# Patient Record
Sex: MALE | Race: WHITE | Employment: PART TIME | URBAN - METROPOLITAN AREA
[De-identification: names, ages, dates, MRNs, and addresses within clinical notes are randomized per-mention and may not be internally consistent; named-entity substitution may affect disease eponyms.]

---

## 2018-08-03 ENCOUNTER — HOSPITAL ENCOUNTER (EMERGENCY)
Age: 48
Discharge: HOME | End: 2018-08-03

## 2018-08-03 VITALS
OXYGEN SATURATION: 98 % | RESPIRATION RATE: 18 BRPM | WEIGHT: 220 LBS | SYSTOLIC BLOOD PRESSURE: 179 MMHG | BODY MASS INDEX: 31.5 KG/M2 | HEIGHT: 70 IN | DIASTOLIC BLOOD PRESSURE: 92 MMHG | HEART RATE: 84 BPM | TEMPERATURE: 97.9 F

## 2018-08-03 DIAGNOSIS — B35.9 TINEA: Primary | ICD-10-CM

## 2018-08-03 PROCEDURE — 99282 EMERGENCY DEPT VISIT SF MDM: CPT

## 2018-08-03 RX ORDER — CLOTRIMAZOLE 1 %
CREAM (GRAM) TOPICAL
Qty: 1 TUBE | Refills: 1 | Status: SHIPPED | OUTPATIENT
Start: 2018-08-03 | End: 2018-08-10

## 2018-08-03 NOTE — ED PROVIDER NOTES
CHIEF COMPLAINT  Chief Complaint   Patient presents with    Rash     to groin. has been there for awhile. pt states that it is now spreading. HISTORY OF PRESENT ILLNESS  Nilesh Enamorado is a 52 y.o. male presents to the emergency room by Private vehicle for evaluation of a rash in the groin ×2 weeks that initially was improving and now is worsened. He states it is itchy and flaky. Warm water has improved and no other treatment prior to arrival.  Becomes worse after a long weekend working in the hot sun. Denies pain. Denies discharge. Denies fever, chills, nausea, vomiting, diarrhea, abdominal pain, injury, and recent infection or antibiotic usage.     ]     No other complaints, modifying factors or associated symptoms. Nursing notes reviewed. Past Medical History:   Diagnosis Date    [facility]     Pneumonia      History reviewed. No pertinent surgical history. History reviewed. No pertinent family history. Social History     Social History    Marital status: Single     Spouse name: N/A    Number of children: N/A    Years of education: N/A     Occupational History    Not on file. Social History Main Topics    Smoking status: Never Smoker    Smokeless tobacco: Never Used    Alcohol use No    Drug use: No    Sexual activity: Yes     Partners: Female     Other Topics Concern    Not on file     Social History Narrative    No narrative on file     No current facility-administered medications for this encounter. Current Outpatient Prescriptions   Medication Sig Dispense Refill    clotrimazole (LOTRIMIN) 1 % cream Apply topically 3 times daily to lesions on skin.  1 Tube 1     No Known Allergies    REVIEW OF SYSTEMS  6 systems reviewed, pertinent positives per HPI otherwise noted to be negative    PHYSICAL EXAM  BP (!) 179/92   Pulse 84   Temp 97.9 °F (36.6 °C) (Oral)   Resp 18   Ht 5' 10\" (1.778 m)   Wt 220 lb (99.8 kg)   SpO2 98%   BMI 31.57 kg/m² stable condition with outpatient follow up. Instructed patient and/or family to return to the emergency room for new or worsening symptoms and any concerns. Instructed to call referrals below to discuss ER visit and follow-up plan. The patient tolerated their visit well. The patient and / or the family were informed of the results of any tests, a time was given to answer questions, a plan was proposed and they agreed with plan. Patient was given scripts for the following medications. I counseled patient how to take these medications. New Prescriptions    CLOTRIMAZOLE (LOTRIMIN) 1 % CREAM    Apply topically 3 times daily to lesions on skin. CLINICAL IMPRESSION  1. Tinea        Blood pressure (!) 179/92, pulse 84, temperature 97.9 °F (36.6 °C), temperature source Oral, resp. rate 18, height 5' 10\" (1.778 m), weight 220 lb (99.8 kg), SpO2 98 %. DISPOSITION  DISPOSITION Decision To Discharge 08/03/2018 07:57:22 PM      PAT[facility]:  [place] [facility]  [address] [facility]  [phone]    If symptoms do not improve, for concerns, worsening or emergent symptoms      DISCHARGE MEDICATIONS:  New Prescriptions    CLOTRIMAZOLE (LOTRIMIN) 1 % CREAM    Apply topically 3 times daily to lesions on skin. DISCONTINUED MEDICATIONS:  Discontinued Medications    IBUPROFEN (IBU) 800 MG TABLET    Take 1 tablet by mouth every 6 hours as needed for Pain                ED Freire CNP (electronically signed)     Acute Care [facility]    Please note that this chart was generated using Dragon dictation software. Although every effort was made to ensure the accuracy of this automated transcription, some errors in transcription may have occurred      I have independently evaluated this patient. A physician was available for consult.         ED Freire CNP  08/03/18 2001

## 2018-09-05 ENCOUNTER — HOSPITAL ENCOUNTER (EMERGENCY)
Age: 48
Discharge: HOME | End: 2018-09-05
Attending: EMERGENCY MEDICINE
Payer: MEDICAID

## 2018-09-05 VITALS
SYSTOLIC BLOOD PRESSURE: 142 MMHG | TEMPERATURE: 98.2 F | HEIGHT: 70 IN | WEIGHT: 230 LBS | RESPIRATION RATE: 17 BRPM | DIASTOLIC BLOOD PRESSURE: 90 MMHG | OXYGEN SATURATION: 96 % | HEART RATE: 88 BPM | BODY MASS INDEX: 32.93 KG/M2

## 2018-09-05 DIAGNOSIS — J06.9 ACUTE UPPER RESPIRATORY INFECTION: Primary | ICD-10-CM

## 2018-09-05 PROCEDURE — 99283 EMERGENCY DEPT VISIT LOW MDM: CPT

## 2018-09-05 PROCEDURE — 6370000000 HC RX 637 (ALT 250 FOR IP): Performed by: EMERGENCY MEDICINE

## 2018-09-05 RX ORDER — OXYMETAZOLINE HYDROCHLORIDE 0.05 G/100ML
2 SPRAY NASAL ONCE
Status: COMPLETED | OUTPATIENT
Start: 2018-09-05 | End: 2018-09-05

## 2018-09-05 RX ORDER — IBUPROFEN 400 MG/1
400 TABLET ORAL EVERY 6 HOURS PRN
Qty: 30 TABLET | Refills: 0 | Status: SHIPPED | OUTPATIENT
Start: 2018-09-05 | End: 2021-09-11

## 2018-09-05 RX ORDER — IBUPROFEN 400 MG/1
400 TABLET ORAL ONCE
Status: COMPLETED | OUTPATIENT
Start: 2018-09-05 | End: 2018-09-05

## 2018-09-05 RX ORDER — PSEUDOEPHEDRINE HCL 120 MG/1
120 TABLET, FILM COATED, EXTENDED RELEASE ORAL EVERY 12 HOURS
Qty: 20 TABLET | Refills: 0 | Status: SHIPPED | OUTPATIENT
Start: 2018-09-05 | End: 2018-09-15

## 2018-09-05 RX ORDER — PSEUDOEPHEDRINE HYDROCHLORIDE 30 MG/1
30 TABLET ORAL ONCE
Status: COMPLETED | OUTPATIENT
Start: 2018-09-05 | End: 2018-09-05

## 2018-09-05 RX ADMIN — PSEUDOEPHEDRINE HCL 30 MG: 30 TABLET, FILM COATED ORAL at 09:51

## 2018-09-05 RX ADMIN — IBUPROFEN 400 MG: 400 TABLET ORAL at 09:51

## 2018-09-05 RX ADMIN — OXYMETAZOLINE HYDROCHLORIDE 2 SPRAY: 5 SPRAY NASAL at 09:51

## 2018-09-05 ASSESSMENT — PAIN DESCRIPTION - PAIN TYPE: TYPE: ACUTE PAIN

## 2018-09-05 ASSESSMENT — PAIN SCALES - GENERAL: PAINLEVEL_OUTOF10: 1

## 2018-09-05 ASSESSMENT — PAIN DESCRIPTION - LOCATION: LOCATION: THROAT

## 2018-09-05 ASSESSMENT — PAIN DESCRIPTION - ORIENTATION: ORIENTATION: LEFT

## 2018-09-05 NOTE — ED NOTES
Discharge instructions reviewed with Mr. Luis Donaldson. He verbalized understanding. Copy of discharge instructions and prescriptions given. Mr. Luis Donaldson was discharged to home in good condition per personal vehicle, friend/family driving. He exited the ED without difficulty.         Adrianna Quinones RN  09/05/18 6757

## 2021-08-05 ENCOUNTER — HOSPITAL ENCOUNTER (INPATIENT)
Age: 51
LOS: 3 days | Discharge: HOME | DRG: 137 | End: 2021-08-09
Attending: EMERGENCY MEDICINE | Admitting: INTERNAL MEDICINE
Payer: MEDICAID

## 2021-08-05 DIAGNOSIS — U07.1 COVID-19: Primary | ICD-10-CM

## 2021-08-05 DIAGNOSIS — I26.99 ACUTE PULMONARY EMBOLISM WITHOUT ACUTE COR PULMONALE, UNSPECIFIED PULMONARY EMBOLISM TYPE (HCC): ICD-10-CM

## 2021-08-05 PROCEDURE — 99284 EMERGENCY DEPT VISIT MOD MDM: CPT

## 2021-08-05 PROCEDURE — 87636 SARSCOV2 & INF A&B AMP PRB: CPT

## 2021-08-05 NOTE — LETTER
[facility] PCU Telemetry  [address] [address]  Phone: [phone]             August 9, 2021    Patient: Mariam Andrade   YOB: 1970   Date of Visit: 8/5/2021       To Whom It May Concern:    Deng Barreto may be excused from work from 07/26/2021 and was seen and treated in our facility  beginning 8/5/2021 until 8/9/2021. He may return to work on 8/16/2021.       Sincerely,       Celia Spencer RN         Signature:__________________________________

## 2021-08-06 ENCOUNTER — APPOINTMENT (OUTPATIENT)
Dept: GENERAL RADIOLOGY | Age: 51
DRG: 137 | End: 2021-08-06
Payer: MEDICAID

## 2021-08-06 ENCOUNTER — APPOINTMENT (OUTPATIENT)
Dept: CT IMAGING | Age: 51
DRG: 137 | End: 2021-08-06
Payer: MEDICAID

## 2021-08-06 PROBLEM — U07.1 PULMONARY EMBOLISM ASSOCIATED WITH COVID-19 (HCC): Status: ACTIVE | Noted: 2021-08-06

## 2021-08-06 PROBLEM — I26.99 PULMONARY EMBOLISM ASSOCIATED WITH COVID-19 (HCC): Status: ACTIVE | Noted: 2021-08-06

## 2021-08-06 LAB
A/G RATIO: 0.9 (ref 1.1–2.2)
ABO/RH: NORMAL
ALBUMIN SERPL-MCNC: 3.6 G/DL (ref 3.4–5)
ALP BLD-CCNC: 102 U/L (ref 40–129)
ALT SERPL-CCNC: 50 U/L (ref 10–40)
ANION GAP SERPL CALCULATED.3IONS-SCNC: 13 MMOL/L (ref 3–16)
ANTIBODY SCREEN: NORMAL
APTT: 31.1 SEC (ref 26.2–38.6)
AST SERPL-CCNC: 41 U/L (ref 15–37)
BASOPHILS ABSOLUTE: 0.1 K/UL (ref 0–0.2)
BASOPHILS RELATIVE PERCENT: 0.7 %
BILIRUB SERPL-MCNC: 0.7 MG/DL (ref 0–1)
BUN BLDV-MCNC: 16 MG/DL (ref 7–20)
CALCIUM SERPL-MCNC: 9.2 MG/DL (ref 8.3–10.6)
CHLORIDE BLD-SCNC: 101 MMOL/L (ref 99–110)
CO2: 22 MMOL/L (ref 21–32)
CREAT SERPL-MCNC: 0.7 MG/DL (ref 0.9–1.3)
D DIMER: >5250 NG/ML DDU (ref 0–229)
EOSINOPHILS ABSOLUTE: 0.2 K/UL (ref 0–0.6)
EOSINOPHILS RELATIVE PERCENT: 1.3 %
GFR AFRICAN AMERICAN: >60
GFR NON-AFRICAN AMERICAN: >60
GLOBULIN: 4 G/DL
GLUCOSE BLD-MCNC: 203 MG/DL (ref 70–99)
GLUCOSE BLD-MCNC: 230 MG/DL (ref 70–99)
GLUCOSE BLD-MCNC: 234 MG/DL (ref 70–99)
GLUCOSE BLD-MCNC: 301 MG/DL (ref 70–99)
GLUCOSE BLD-MCNC: 309 MG/DL (ref 70–99)
HCT VFR BLD CALC: 41.9 % (ref 40.5–52.5)
HEMOGLOBIN: 14.3 G/DL (ref 13.5–17.5)
INFLUENZA A: NOT DETECTED
INFLUENZA B: NOT DETECTED
LYMPHOCYTES ABSOLUTE: 1.8 K/UL (ref 1–5.1)
LYMPHOCYTES RELATIVE PERCENT: 13.1 %
MCH RBC QN AUTO: 29.2 PG (ref 26–34)
MCHC RBC AUTO-ENTMCNC: 34.2 G/DL (ref 31–36)
MCV RBC AUTO: 85.4 FL (ref 80–100)
MONOCYTES ABSOLUTE: 0.9 K/UL (ref 0–1.3)
MONOCYTES RELATIVE PERCENT: 6.2 %
NEUTROPHILS ABSOLUTE: 11 K/UL (ref 1.7–7.7)
NEUTROPHILS RELATIVE PERCENT: 78.7 %
PDW BLD-RTO: 13 % (ref 12.4–15.4)
PERFORMED ON: ABNORMAL
PLATELET # BLD: 256 K/UL (ref 135–450)
PMV BLD AUTO: 9.9 FL (ref 5–10.5)
POTASSIUM REFLEX MAGNESIUM: 3.8 MMOL/L (ref 3.5–5.1)
RBC # BLD: 4.9 M/UL (ref 4.2–5.9)
SARS-COV-2 RNA, RT PCR: DETECTED
SODIUM BLD-SCNC: 136 MMOL/L (ref 136–145)
TOTAL PROTEIN: 7.6 G/DL (ref 6.4–8.2)
TROPONIN: <0.01 NG/ML
WBC # BLD: 13.9 K/UL (ref 4–11)

## 2021-08-06 PROCEDURE — 2580000003 HC RX 258: Performed by: INTERNAL MEDICINE

## 2021-08-06 PROCEDURE — 2580000003 HC RX 258: Performed by: EMERGENCY MEDICINE

## 2021-08-06 PROCEDURE — 6370000000 HC RX 637 (ALT 250 FOR IP): Performed by: INTERNAL MEDICINE

## 2021-08-06 PROCEDURE — 85379 FIBRIN DEGRADATION QUANT: CPT

## 2021-08-06 PROCEDURE — 99223 1ST HOSP IP/OBS HIGH 75: CPT | Performed by: INTERNAL MEDICINE

## 2021-08-06 PROCEDURE — 6360000004 HC RX CONTRAST MEDICATION: Performed by: EMERGENCY MEDICINE

## 2021-08-06 PROCEDURE — 6360000002 HC RX W HCPCS: Performed by: INTERNAL MEDICINE

## 2021-08-06 PROCEDURE — 94640 AIRWAY INHALATION TREATMENT: CPT

## 2021-08-06 PROCEDURE — 85025 COMPLETE CBC W/AUTO DIFF WBC: CPT

## 2021-08-06 PROCEDURE — 87449 NOS EACH ORGANISM AG IA: CPT

## 2021-08-06 PROCEDURE — 85730 THROMBOPLASTIN TIME PARTIAL: CPT

## 2021-08-06 PROCEDURE — 87205 SMEAR GRAM STAIN: CPT

## 2021-08-06 PROCEDURE — 6360000002 HC RX W HCPCS: Performed by: EMERGENCY MEDICINE

## 2021-08-06 PROCEDURE — 84484 ASSAY OF TROPONIN QUANT: CPT

## 2021-08-06 PROCEDURE — 2060000000 HC ICU INTERMEDIATE R&B

## 2021-08-06 PROCEDURE — 87070 CULTURE OTHR SPECIMN AEROBIC: CPT

## 2021-08-06 PROCEDURE — 71045 X-RAY EXAM CHEST 1 VIEW: CPT

## 2021-08-06 PROCEDURE — 71260 CT THORAX DX C+: CPT

## 2021-08-06 PROCEDURE — 36415 COLL VENOUS BLD VENIPUNCTURE: CPT

## 2021-08-06 PROCEDURE — 86901 BLOOD TYPING SEROLOGIC RH(D): CPT

## 2021-08-06 PROCEDURE — 87040 BLOOD CULTURE FOR BACTERIA: CPT

## 2021-08-06 PROCEDURE — 86850 RBC ANTIBODY SCREEN: CPT

## 2021-08-06 PROCEDURE — 80053 COMPREHEN METABOLIC PANEL: CPT

## 2021-08-06 PROCEDURE — XW033E5 INTRODUCTION OF REMDESIVIR ANTI-INFECTIVE INTO PERIPHERAL VEIN, PERCUTANEOUS APPROACH, NEW TECHNOLOGY GROUP 5: ICD-10-PCS | Performed by: INTERNAL MEDICINE

## 2021-08-06 PROCEDURE — 96365 THER/PROPH/DIAG IV INF INIT: CPT

## 2021-08-06 PROCEDURE — 99255 IP/OBS CONSLTJ NEW/EST HI 80: CPT | Performed by: INTERNAL MEDICINE

## 2021-08-06 PROCEDURE — 83036 HEMOGLOBIN GLYCOSYLATED A1C: CPT

## 2021-08-06 PROCEDURE — 86900 BLOOD TYPING SEROLOGIC ABO: CPT

## 2021-08-06 RX ORDER — DEXTROSE MONOHYDRATE 25 G/50ML
12.5 INJECTION, SOLUTION INTRAVENOUS PRN
Status: DISCONTINUED | OUTPATIENT
Start: 2021-08-06 | End: 2021-08-09 | Stop reason: HOSPADM

## 2021-08-06 RX ORDER — HEPARIN SODIUM 1000 [USP'U]/ML
6700 INJECTION, SOLUTION INTRAVENOUS; SUBCUTANEOUS PRN
Status: DISCONTINUED | OUTPATIENT
Start: 2021-08-06 | End: 2021-08-06

## 2021-08-06 RX ORDER — 0.9 % SODIUM CHLORIDE 0.9 %
1000 INTRAVENOUS SOLUTION INTRAVENOUS ONCE
Status: COMPLETED | OUTPATIENT
Start: 2021-08-06 | End: 2021-08-06

## 2021-08-06 RX ORDER — DEXAMETHASONE SODIUM PHOSPHATE 10 MG/ML
6 INJECTION, SOLUTION INTRAMUSCULAR; INTRAVENOUS EVERY 24 HOURS
Status: DISCONTINUED | OUTPATIENT
Start: 2021-08-06 | End: 2021-08-09 | Stop reason: HOSPADM

## 2021-08-06 RX ORDER — AZITHROMYCIN 250 MG/1
500 TABLET, FILM COATED ORAL DAILY
Status: DISCONTINUED | OUTPATIENT
Start: 2021-08-06 | End: 2021-08-09 | Stop reason: HOSPADM

## 2021-08-06 RX ORDER — ONDANSETRON 2 MG/ML
4 INJECTION INTRAMUSCULAR; INTRAVENOUS EVERY 6 HOURS PRN
Status: DISCONTINUED | OUTPATIENT
Start: 2021-08-06 | End: 2021-08-09 | Stop reason: HOSPADM

## 2021-08-06 RX ORDER — SODIUM CHLORIDE 0.9 % (FLUSH) 0.9 %
5-40 SYRINGE (ML) INJECTION EVERY 12 HOURS SCHEDULED
Status: DISCONTINUED | OUTPATIENT
Start: 2021-08-06 | End: 2021-08-09 | Stop reason: HOSPADM

## 2021-08-06 RX ORDER — HEPARIN SODIUM 1000 [USP'U]/ML
6700 INJECTION, SOLUTION INTRAVENOUS; SUBCUTANEOUS ONCE
Status: COMPLETED | OUTPATIENT
Start: 2021-08-06 | End: 2021-08-06

## 2021-08-06 RX ORDER — ALBUTEROL SULFATE 90 UG/1
2 AEROSOL, METERED RESPIRATORY (INHALATION)
Status: DISCONTINUED | OUTPATIENT
Start: 2021-08-06 | End: 2021-08-06

## 2021-08-06 RX ORDER — SODIUM CHLORIDE 0.9 % (FLUSH) 0.9 %
5-40 SYRINGE (ML) INJECTION PRN
Status: DISCONTINUED | OUTPATIENT
Start: 2021-08-06 | End: 2021-08-09 | Stop reason: HOSPADM

## 2021-08-06 RX ORDER — GUAIFENESIN/DEXTROMETHORPHAN 100-10MG/5
5 SYRUP ORAL EVERY 4 HOURS PRN
Status: DISCONTINUED | OUTPATIENT
Start: 2021-08-06 | End: 2021-08-09 | Stop reason: HOSPADM

## 2021-08-06 RX ORDER — SODIUM CHLORIDE 9 MG/ML
INJECTION, SOLUTION INTRAVENOUS PRN
Status: DISCONTINUED | OUTPATIENT
Start: 2021-08-06 | End: 2021-08-09 | Stop reason: HOSPADM

## 2021-08-06 RX ORDER — BENZONATATE 100 MG/1
100 CAPSULE ORAL 3 TIMES DAILY PRN
Status: DISCONTINUED | OUTPATIENT
Start: 2021-08-06 | End: 2021-08-09 | Stop reason: HOSPADM

## 2021-08-06 RX ORDER — VITAMIN B COMPLEX
2000 TABLET ORAL DAILY
Status: DISCONTINUED | OUTPATIENT
Start: 2021-08-06 | End: 2021-08-09 | Stop reason: HOSPADM

## 2021-08-06 RX ORDER — ALBUTEROL SULFATE 90 UG/1
2 AEROSOL, METERED RESPIRATORY (INHALATION) EVERY 6 HOURS PRN
Status: DISCONTINUED | OUTPATIENT
Start: 2021-08-06 | End: 2021-08-09 | Stop reason: HOSPADM

## 2021-08-06 RX ORDER — DEXTROSE MONOHYDRATE 50 MG/ML
100 INJECTION, SOLUTION INTRAVENOUS PRN
Status: DISCONTINUED | OUTPATIENT
Start: 2021-08-06 | End: 2021-08-09 | Stop reason: HOSPADM

## 2021-08-06 RX ORDER — ACETAMINOPHEN 650 MG/1
650 SUPPOSITORY RECTAL EVERY 6 HOURS PRN
Status: DISCONTINUED | OUTPATIENT
Start: 2021-08-06 | End: 2021-08-09 | Stop reason: HOSPADM

## 2021-08-06 RX ORDER — HEPARIN SODIUM 1000 [USP'U]/ML
3400 INJECTION, SOLUTION INTRAVENOUS; SUBCUTANEOUS PRN
Status: DISCONTINUED | OUTPATIENT
Start: 2021-08-06 | End: 2021-08-06

## 2021-08-06 RX ORDER — ACETAMINOPHEN 325 MG/1
650 TABLET ORAL EVERY 6 HOURS PRN
Status: DISCONTINUED | OUTPATIENT
Start: 2021-08-06 | End: 2021-08-09 | Stop reason: HOSPADM

## 2021-08-06 RX ORDER — POLYETHYLENE GLYCOL 3350 17 G/17G
17 POWDER, FOR SOLUTION ORAL DAILY PRN
Status: DISCONTINUED | OUTPATIENT
Start: 2021-08-06 | End: 2021-08-09 | Stop reason: HOSPADM

## 2021-08-06 RX ORDER — SODIUM CHLORIDE 9 MG/ML
INJECTION, SOLUTION INTRAVENOUS CONTINUOUS
Status: DISCONTINUED | OUTPATIENT
Start: 2021-08-06 | End: 2021-08-09 | Stop reason: HOSPADM

## 2021-08-06 RX ORDER — HEPARIN SODIUM 10000 [USP'U]/100ML
1510 INJECTION, SOLUTION INTRAVENOUS CONTINUOUS
Status: DISCONTINUED | OUTPATIENT
Start: 2021-08-06 | End: 2021-08-06

## 2021-08-06 RX ORDER — NICOTINE POLACRILEX 4 MG
15 LOZENGE BUCCAL PRN
Status: DISCONTINUED | OUTPATIENT
Start: 2021-08-06 | End: 2021-08-09 | Stop reason: HOSPADM

## 2021-08-06 RX ORDER — SODIUM CHLORIDE 9 MG/ML
25 INJECTION, SOLUTION INTRAVENOUS PRN
Status: DISCONTINUED | OUTPATIENT
Start: 2021-08-06 | End: 2021-08-09 | Stop reason: HOSPADM

## 2021-08-06 RX ADMIN — CEFTRIAXONE SODIUM 1000 MG: 1 INJECTION, POWDER, FOR SOLUTION INTRAMUSCULAR; INTRAVENOUS at 16:16

## 2021-08-06 RX ADMIN — AZITHROMYCIN 500 MG: 250 TABLET, FILM COATED ORAL at 16:36

## 2021-08-06 RX ADMIN — HEPARIN SODIUM 6700 UNITS: 1000 INJECTION INTRAVENOUS; SUBCUTANEOUS at 04:57

## 2021-08-06 RX ADMIN — DEXAMETHASONE SODIUM PHOSPHATE 6 MG: 10 INJECTION, SOLUTION INTRAMUSCULAR; INTRAVENOUS at 16:19

## 2021-08-06 RX ADMIN — INSULIN LISPRO 2 UNITS: 100 INJECTION, SOLUTION INTRAVENOUS; SUBCUTANEOUS at 22:00

## 2021-08-06 RX ADMIN — BENZONATATE 100 MG: 100 CAPSULE ORAL at 10:03

## 2021-08-06 RX ADMIN — GUAIFENESIN AND DEXTROMETHORPHAN 5 ML: 100; 10 SYRUP ORAL at 06:24

## 2021-08-06 RX ADMIN — APIXABAN 10 MG: 5 TABLET, FILM COATED ORAL at 10:30

## 2021-08-06 RX ADMIN — GUAIFENESIN AND DEXTROMETHORPHAN 5 ML: 100; 10 SYRUP ORAL at 10:04

## 2021-08-06 RX ADMIN — APIXABAN 10 MG: 5 TABLET, FILM COATED ORAL at 22:00

## 2021-08-06 RX ADMIN — SODIUM CHLORIDE: 9 INJECTION, SOLUTION INTRAVENOUS at 18:13

## 2021-08-06 RX ADMIN — SODIUM CHLORIDE: 9 INJECTION, SOLUTION INTRAVENOUS at 06:24

## 2021-08-06 RX ADMIN — SODIUM CHLORIDE 1000 ML: 9 INJECTION, SOLUTION INTRAVENOUS at 00:56

## 2021-08-06 RX ADMIN — HEPARIN SODIUM 1510 UNITS/HR: 10000 INJECTION, SOLUTION INTRAVENOUS at 03:48

## 2021-08-06 RX ADMIN — Medication 2000 UNITS: at 10:03

## 2021-08-06 RX ADMIN — IOPAMIDOL 85 ML: 755 INJECTION, SOLUTION INTRAVENOUS at 02:38

## 2021-08-06 RX ADMIN — IPRATROPIUM BROMIDE AND ALBUTEROL 1 PUFF: 20; 100 SPRAY, METERED RESPIRATORY (INHALATION) at 16:04

## 2021-08-06 RX ADMIN — IPRATROPIUM BROMIDE AND ALBUTEROL 1 PUFF: 20; 100 SPRAY, METERED RESPIRATORY (INHALATION) at 07:52

## 2021-08-06 ASSESSMENT — PAIN SCALES - GENERAL: PAINLEVEL_OUTOF10: 0

## 2021-08-06 NOTE — ED PROVIDER NOTES
[address] [facility] PCU TELEMETRY      CHIEF COMPLAINT  Concern For FARIT-65 (Pt states he has had SOB, cough, loss of smell since . States it has worsened, pt O2 90-93% on RA during triage. )       HISTORY OF PRESENT ILLNESS  Mariluz Chaves is a 48 y.o. male  who presents to the ED for evaluation of shortness of breath. Patient reports he has all the classic symptoms of COVID-19. Reports started , he has been having cough, shortness of breath, loss of smell, fatigue. Reports he has been feeling miserable and cough has been worsening. Reports he was initially having shortness of breath with exertion. Now it has progressed to where he feels short of breath even at rest.  Reports decreased p.o. intake over the past several days. Reports multiple family members have had the same symptoms. He has not had Covid vaccination. Denies having history of asthma or COPD. Denies history of smoking. Denies having chest pain. No other complaints, modifying factors or associated symptoms. I have reviewed the following from the nursing documentation. Past Medical History:   Diagnosis Date    Diabetes mellitus ([address]     Pneumonia      Past Surgical History:   Procedure Laterality Date    COCCYX FRACTURE SURGERY       History reviewed. No pertinent family history.   Social History     Socioeconomic History    Marital status: Single     Spouse name: Not on file    Number of children: Not on file    Years of education: Not on file    Highest education level: Not on file   Occupational History    Not on file   Tobacco Use    Smoking status: Former Smoker     Types: Cigarettes     Quit date:      Years since quittin.6    Smokeless tobacco: Never Used   Vaping Use    Vaping Use: Never used   Substance and Sexual Activity    Alcohol use: No    Drug use: No    Sexual activity: Yes     Partners: Female   Other Topics Concern    Not on file   Social History Narrative    Not on file     Social Determinants of Health     Financial Resource Strain:     Difficulty of Paying Living Expenses:    Food Insecurity:     Worried About Running Out of Food in the Last Year:     [address] in the Last Year:    Transportation Needs:     Lack of Transportation (Medical):      Lack of Transportation (Non-Medical):    Physical Activity:     Days of Exercise per Week:     Minutes of Exercise per Session:    Stress:     Feeling of Stress :    Social Connections:     Frequency of Communication with Friends and Family:     Frequency of Social Gatherings with Friends and Family:     Attends Yarsanism Services:     Active Member of Clubs or Organizations:     Attends Club or Organization Meetings:     Marital Status:    Intimate Partner Violence:     Fear of Current or Ex-Partner:     Emotionally Abused:     Physically Abused:     Sexually Abused:      Current Facility-Administered Medications   Medication Dose Route Frequency Provider Last Rate Last Admin    insulin glargine (LANTUS) injection vial 10 Units  10 Units Subcutaneous Once Juan Antonio Brown MD        amLODIPine (NORVASC) tablet 5 mg  5 mg Oral Daily Juan Antonio Brown MD        [facility]dansetron [facility]) injection 4 mg  4 mg Intravenous Q6H PRN Vi King MD        albuterol sulfate  (90 Base) MCG/ACT inhaler 2 puff  2 puff Inhalation Q6H PRN Vi King MD        glucose (GLUTOSE) 40 % oral gel 15 g  15 g Oral PRN Vi King MD        dextrose 50 % IV solution  12.5 g Intravenous PRN Vi King MD        glucagon (rDNA) injection 1 mg  1 mg Intramuscular PRN Vi King MD        dextrose 5 % solution  100 mL/hr Intravenous PRN Vi King MD        sodium chloride flush 0.9 % injection 5-40 mL  5-40 mL Intravenous 2 times per day Vi King MD        sodium chloride flush 0.9 % injection 5-40 mL  5-40 mL Intravenous PRN Vi King MD        0.9 % sodium chloride infusion 25 mL Intravenous PRN Meryl Pearce MD        polyethylene glycol [facility]) packet 17 g  17 g Oral Daily PRN Meryl Pearce MD        acetaminophen (TYLENOL) tablet 650 mg  650 mg Oral Q6H PRN Meryl Pearce MD        Or    acetaminophen (TYLENOL) suppository 650 mg  650 mg Rectal Q6H PRN Meryl Pearce MD        0.9 % sodium chloride infusion   Intravenous Continuous Meryl Pearce MD 75 mL/hr at 08/06/21 1833 Rate Verify at 08/06/21 1833    insulin lispro (HUMALOG) injection vial 0-6 Units  0-6 Units Subcutaneous TID WC Meryl Pearce MD   2 Units at 08/06/21 1637    insulin lispro (HUMALOG) injection vial 0-3 Units  0-3 Units Subcutaneous Nightly Meryl Pearce MD   2 Units at 08/06/21 2200    guaiFENesin-dextromethorphan (ROBITUSSIN DM) 100-10 MG/5ML syrup 5 mL  5 mL Oral Q4H PRN Meryl Pearce MD   5 mL at 08/06/21 1004    Vitamin D (CHOLECALCIFEROL) tablet 2,000 Units  2,000 Units Oral Daily Meryl Pearce MD   2,000 Units at 08/06/21 1003    apixaban (ELIQUIS) tablet 10 mg  10 mg Oral BID Serafin Lay MD   10 mg at 08/06/21 2200    benzonatate (TESSALON) capsule 100 mg  100 mg Oral TID PRN Serafin Lay MD   100 mg at 08/06/21 1003    dexamethasone (PF) (DECADRON) injection 6 mg  6 mg Intravenous Q24H Desire Gonsales MD   6 mg at 08/06/21 1619    cefTRIAXone (ROCEPHIN) 1000 mg IVPB in 50 mL D5W minibag  1,000 mg Intravenous Q24H Desire Gonsales MD   Stopped at 08/06/21 1651    azithromycin (ZITHROMAX) tablet 500 mg  500 mg Oral Daily Desire Gonsales MD   500 mg at 08/06/21 1636    0.9 % sodium chloride infusion   Intravenous PRN Desire Gonsales MD        remdesivir 200 mg in sodium chloride 0.9 % 250 mL IVPB  200 mg Intravenous Once Desire Gonsales MD        Followed by   Rodriguez Felder remdesivir 100 mg in sodium chloride 0.9 % 250 mL IVPB  100 mg Intravenous Q24H Desire Gonsales MD        albuterol-ipratropium (COMBIVENT RESPIMAT)  MCG/ACT inhaler 1 puff  1 puff Inhalation Q4H ITALIA Hinton MD         No Known Allergies    REVIEW OF SYSTEMS  10 systems reviewed, pertinent positives per HPI otherwise noted to be negative. PHYSICAL EXAM  BP (!) 161/85   Pulse 89   Temp 98 °F (36.7 °C) (Oral)   Resp 18   Ht 5' 10\" (1.778 m)   Wt 238 lb (108 kg)   SpO2 92%   BMI 34.15 kg/m²    GENERAL APPEARANCE: Awake and alert. Ill-appearing  HENT: Normocephalic. Atraumatic. PERRL. EOMI. No facial droop. HEART/CHEST: RRR. LUNGS: Speaking in short sentences. Decreased breath sounds bilaterally. ABDOMEN: Soft, non-distended abdomen. Non tender to palpation. No guarding. No rebound. EXTREMITIES: No gross deformities. Moving all extremities. SKIN: Warm and dry. No acute rashes. NEUROLOGICAL: Alert and oriented. No gross facial drooping. Answering questions appropriately. Moving all extremities. PSYCHIATRIC: Pleasant. Normal mood and affect.     LABS  Results for orders placed or performed during the hospital encounter of 08/05/21   COVID-19 & Influenza Combo    Specimen: Nasopharyngeal Swab; Nose   Result Value Ref Range    SARS-CoV-2 RNA, RT PCR DETECTED (A) NOT DETECTED    INFLUENZA A NOT DETECTED NOT DETECTED    INFLUENZA B NOT DETECTED NOT DETECTED   CBC auto differential   Result Value Ref Range    WBC 13.9 (H) 4.0 - 11.0 K/uL    RBC 4.90 4.20 - 5.90 M/uL    Hemoglobin 14.3 13.5 - 17.5 g/dL    Hematocrit 41.9 40.5 - 52.5 %    MCV 85.4 80.0 - 100.0 fL    MCH 29.2 26.0 - 34.0 pg    MCHC 34.2 31.0 - 36.0 g/dL    RDW 13.0 12.4 - 15.4 %    Platelets 878 641 - 866 K/uL    MPV 9.9 5.0 - 10.5 fL    Neutrophils % 78.7 %    Lymphocytes % 13.1 %    Monocytes % 6.2 %    Eosinophils % 1.3 %    Basophils % 0.7 %    Neutrophils Absolute 11.0 (H) 1.7 - 7.7 K/uL    Lymphocytes Absolute 1.8 1.0 - 5.1 K/uL    Monocytes Absolute 0.9 0.0 - 1.3 K/uL    Eosinophils Absolute 0.2 0.0 - 0.6 K/uL    Basophils Absolute 0.1 0.0 - 0.2 K/uL   Troponin   Result Value Ref Range    Troponin <0.01 <0.01 ng/mL D-Dimer, Quantitative   Result Value Ref Range    D-Dimer, Quant >5250 (H) 0 - 229 ng/mL DDU   Comprehensive Metabolic Panel w/ Reflex to MG   Result Value Ref Range    Sodium 136 136 - 145 mmol/L    Potassium reflex Magnesium 3.8 3.5 - 5.1 mmol/L    Chloride 101 99 - 110 mmol/L    CO2 22 21 - 32 mmol/L    Anion Gap 13 3 - 16    Glucose 301 (H) 70 - 99 mg/dL    BUN 16 7 - 20 mg/dL    CREATININE 0.7 (L) 0.9 - 1.3 mg/dL    GFR Non-African American >60 >60    GFR African American >60 >60    Calcium 9.2 8.3 - 10.6 mg/dL    Total Protein 7.6 6.4 - 8.2 g/dL    Albumin 3.6 3.4 - 5.0 g/dL    Albumin/Globulin Ratio 0.9 (L) 1.1 - 2.2    Total Bilirubin 0.7 0.0 - 1.0 mg/dL    Alkaline Phosphatase 102 40 - 129 U/L    ALT 50 (H) 10 - 40 U/L    AST 41 (H) 15 - 37 U/L    Globulin 4.0 g/dL   APTT   Result Value Ref Range    aPTT 31.1 26.2 - 38.6 sec   Hemoglobin A1c   Result Value Ref Range    Hemoglobin A1C 10.9 See comment %    eAG 266.1 mg/dL   Comprehensive Metabolic Panel w/ Reflex to MG   Result Value Ref Range    Sodium 137 136 - 145 mmol/L    Potassium reflex Magnesium 4.3 3.5 - 5.1 mmol/L    Chloride 106 99 - 110 mmol/L    CO2 18 (L) 21 - 32 mmol/L    Anion Gap 13 3 - 16    Glucose 274 (H) 70 - 99 mg/dL    BUN 16 7 - 20 mg/dL    CREATININE 0.6 (L) 0.9 - 1.3 mg/dL    GFR Non-African American >60 >60    GFR African American >60 >60    Calcium 8.4 8.3 - 10.6 mg/dL    Total Protein 6.7 6.4 - 8.2 g/dL    Albumin 2.7 (L) 3.4 - 5.0 g/dL    Albumin/Globulin Ratio 0.7 (L) 1.1 - 2.2    Total Bilirubin 0.6 0.0 - 1.0 mg/dL    Alkaline Phosphatase 73 40 - 129 U/L    ALT 47 (H) 10 - 40 U/L    AST 30 15 - 37 U/L    Globulin 4.0 g/dL   CBC Auto Differential   Result Value Ref Range    WBC 10.4 4.0 - 11.0 K/uL    RBC 4.65 4.20 - 5.90 M/uL    Hemoglobin 13.7 13.5 - 17.5 g/dL    Hematocrit 39.8 (L) 40.5 - 52.5 %    MCV 85.7 80.0 - 100.0 fL    MCH 29.5 26.0 - 34.0 pg    MCHC 34.4 31.0 - 36.0 g/dL    RDW 12.8 12.4 - 15.4 %    Platelets 249 135 - 450 K/uL    MPV 9.1 5.0 - 10.5 fL    Neutrophils % 76.6 %    Lymphocytes % 15.0 %    Monocytes % 8.1 %    Eosinophils % 0.1 %    Basophils % 0.2 %    Neutrophils Absolute 8.0 (H) 1.7 - 7.7 K/uL    Lymphocytes Absolute 1.6 1.0 - 5.1 K/uL    Monocytes Absolute 0.8 0.0 - 1.3 K/uL    Eosinophils Absolute 0.0 0.0 - 0.6 K/uL    Basophils Absolute 0.0 0.0 - 0.2 K/uL   POCT Glucose   Result Value Ref Range    POC Glucose 234 (H) 70 - 99 mg/dl    Performed on ACCU-CHEK    POCT Glucose   Result Value Ref Range    POC Glucose 203 (H) 70 - 99 mg/dl    Performed on ACCU-CHEK    POCT Glucose   Result Value Ref Range    POC Glucose 230 (H) 70 - 99 mg/dl    Performed on ACCU-CHEK    POCT Glucose   Result Value Ref Range    POC Glucose 309 (H) 70 - 99 mg/dl    Performed on ACCU-CHEK    POCT Glucose   Result Value Ref Range    POC Glucose 261 (H) 70 - 99 mg/dl    Performed on ACCU-CHEK    TYPE AND SCREEN   Result Value Ref Range    ABO/Rh O POS     Antibody Screen NEG        I have reviewed all labs for this visit. RADIOLOGY  XR CHEST PORTABLE    Result Date: 8/6/2021  EXAMINATION: ONE XRAY VIEW OF THE CHEST 8/6/2021 12:59 am COMPARISON: None. HISTORY: ORDERING SYSTEM PROVIDED HISTORY: other TECHNOLOGIST PROVIDED HISTORY: Reason for exam:->other Reason for Exam: covid Acuity: Acute Type of Exam: Initial Shortness of breath, cough, loss of smell FINDINGS: There are patchy airspace opacities throughout both lungs. The heart size is at the upper limits of normal.  There is no large pleural effusion or definite evidence for pneumothorax. Commonly reported imaging features of COVID-19 pneumonia are present. Other processes such as influenza pneumonia and organizing pneumonia, as can be seen with drug toxicity and connective tissue disease, can cause a similar imaging pattern.  PneTyp     CT CHEST PULMONARY EMBOLISM W CONTRAST    Result Date: 8/6/2021  EXAMINATION: CTA OF THE CHEST 8/6/2021 2:38 am TECHNIQUE: CTA of the chest was performed after the administration of intravenous contrast.  Multiplanar reformatted images are provided for review. MIP images are provided for review. Dose modulation, iterative reconstruction, and/or weight based adjustment of the mA/kV was utilized to reduce the radiation dose to as low as reasonably achievable. COMPARISON: None. HISTORY: ORDERING SYSTEM PROVIDED HISTORY: covid +. soa. d dimer >5k TECHNOLOGIST PROVIDED HISTORY: Reason for exam:->covid +. soa. d dimer >5k Decision Support Exception - unselect if not a suspected or confirmed emergency medical condition->Emergency Medical Condition ([place]) Reason for Exam: covid positive sob cough Acuity: Acute Type of Exam: Initial FINDINGS: Pulmonary Arteries: Pulmonary arteries are adequately opacified for evaluation. Right lower lobe pulmonary emboli are identified. No emboli are seen in the remaining lobes. Main pulmonary artery is normal in caliber. Mediastinum: Scattered mediastinal lymph nodes are likely reactive. The heart and pericardium demonstrate no acute abnormality. Coronary artery calcifications are present. There is no acute abnormality of the thoracic aorta. Lungs/pleura: The central airways are patent. There is no pneumothorax or pleural effusion. There are ground-glass opacities throughout both lungs. Upper Abdomen: Limited images of the upper abdomen are unremarkable. Soft Tissues/Bones: No acute bone or soft tissue abnormality. 1. Right lower lobe pulmonary emboli. 2.  Commonly reported imaging features of COVID-19 pneumonia are present. Other processes such as influenza pneumonia and organizing pneumonia, as can be seen with drug toxicity and connective tissue disease, can cause a similar imaging pattern. PneTyp 3. Coronary artery disease. Critical results were called by Dr. Wellington Morgan MD to Dr. Radha Jean on 8/6/2021 at 03:29. ED COURSE/MDM  Patient seen and evaluated.  At presentation, patient was awake, alert, afebrile, hemodynamically stable, and satting well on room air. Patient did appear dyspneic, pausing in between sentences to catch his breath. No significant leg swelling present. Differential diagnosis includes ACS, arrhythmia, PE, COVID-19 infection, pneumonia, among others. Given this, CBC, CMP, lactate, troponin, EKG, chest x-ray and D-dimer obtained. Patient given 1 L IV fluid bolus and Zofran for symptoms. On reassessment, patient reports improved symptoms. Patient remained stable on cardiac monitor. Labs remarkable for mild leukocytosis with white blood cell count of 13.9. Troponin negative. D-dimer was greater than 5250. CT PE obtained. Creatinines normal.  Electrolytes within normal limits. ALT and AST borderline elevated at 50 and 41 respectively. CT shows pulmonary embolism. Patient started on heparin. COVID-19 swab was also positive. Joe the results of the work-up with the patient. Patient initially declined admission. However, after discussion, patient agreeable with plan for) and admission. Hospitalist consulted for admission. Admit. I provided at least 40 minutes of critical care excluding separately billable procedures. Pt was seen during the [place] 19 pandemic. Appropriate PPE worn by ME during patient encounters. Pt seen during a time with constrained hospital bed capacity and other potential inpatient and outpatient resources were constrained due to the viral pandemic.      During the patient's ED course, the patient was given:  Medications   ondansetron (ZOFRAN) injection 4 mg (has no administration in time range)   albuterol sulfate  (90 Base) MCG/ACT inhaler 2 puff (has no administration in time range)   glucose (GLUTOSE) 40 % oral gel 15 g (has no administration in time range)   dextrose 50 % IV solution (has no administration in time range)   glucagon (rDNA) injection 1 mg (has no administration in time range)   dextrose 5 % solution (has no administration in time range) sodium chloride flush 0.9 % injection 5-40 mL (5 mLs Intravenous Not Given 8/6/21 2215)   sodium chloride flush 0.9 % injection 5-40 mL (has no administration in time range)   0.9 % sodium chloride infusion (has no administration in time range)   polyethylene glycol (GLYCOLAX) packet 17 g (has no administration in time range)   acetaminophen (TYLENOL) tablet 650 mg (has no administration in time range)     Or   acetaminophen (TYLENOL) suppository 650 mg (has no administration in time range)   0.9 % sodium chloride infusion ( Intravenous Rate/Dose Verify 8/6/21 1833)   insulin lispro (HUMALOG) injection vial 0-6 Units (2 Units Subcutaneous Given 8/6/21 1637)   insulin lispro (HUMALOG) injection vial 0-3 Units (2 Units Subcutaneous Given 8/6/21 2200)   guaiFENesin-dextromethorphan (ROBITUSSIN DM) 100-10 MG/5ML syrup 5 mL (5 mLs Oral Given 8/6/21 1004)   Vitamin D (CHOLECALCIFEROL) tablet 2,000 Units (2,000 Units Oral Given 8/6/21 1003)   apixaban (ELIQUIS) tablet 10 mg (10 mg Oral Given 8/6/21 2200)   benzonatate (TESSALON) capsule 100 mg (100 mg Oral Given 8/6/21 1003)   dexamethasone (PF) (DECADRON) injection 6 mg (6 mg Intravenous Given 8/6/21 1619)   cefTRIAXone (ROCEPHIN) 1000 mg IVPB in 50 mL D5W minibag (0 mg Intravenous Stopped 8/6/21 1657)   azithromycin (ZITHROMAX) tablet 500 mg (500 mg Oral Given 8/6/21 1636)   0.9 % sodium chloride infusion (has no administration in time range)   remdesivir 200 mg in sodium chloride 0.9 % 250 mL IVPB (200 mg Intravenous Not Given 8/6/21 1657)     Followed by   remdesivir 100 mg in sodium chloride 0.9 % 250 mL IVPB (has no administration in time range)   albuterol-ipratropium (COMBIVENT RESPIMAT)  MCG/ACT inhaler 1 puff (has no administration in time range)   insulin glargine (LANTUS) injection vial 10 Units (has no administration in time range)   amLODIPine (NORVASC) tablet 5 mg (has no administration in time range)   0.9 % sodium chloride bolus (0 mLs Intravenous Stopped 8/6/21 0140)   iopamidol (ISOVUE-370) 76 % injection 85 mL (85 mLs Intravenous Given 8/6/21 0238)   heparin (porcine) injection 6,700 Units (6,700 Units Intravenous Given 8/6/21 3916)        CLINICAL IMPRESSION  1. COVID-19    2. Acute pulmonary embolism without acute cor pulmonale, unspecified pulmonary embolism type (HCC)        Blood pressure (!) 161/85, pulse 89, temperature 98 °F (36.7 °C), temperature source Oral, resp. rate 18, height 5' 10\" (1.778 m), weight 238 lb (108 kg), SpO2 92 %. Kendell Jurado was admitted to the hospital.       Patient was given scripts for the following medications. I counseled patient how to take these medications. Current Discharge Medication List          Follow-up with:  Zada [address] [place] [address] [place], [place] [id number]  Call  For new patient appointment      DISCLAIMER: This chart was created using Dragon dictation software. Efforts were made by me to ensure accuracy, however some errors may be present due to limitations of this technology and occasionally words are not transcribed correctly.         Barry Encinas MD  08/07/21 6068

## 2021-08-06 NOTE — PROGRESS NOTES
After educating pt on uses, side effects, and reasons for convalescent plasma and Remdesivir, pt refuses these treatments. Pt was agreeable to decadron, rocephin, and zithro. Dr. Halley Hutson made aware via telephone. Call out to Dr. Elias [place] to make him aware. Lab also made aware that pt does not want CCP at this time.

## 2021-08-06 NOTE — FLOWSHEET NOTE
08/06/21 0945   Vital Signs   Temp 98.4 °F (36.9 °C)   Temp Source Oral   Pulse 93   Heart Rate Source Monitor   Resp 19   BP (!) 156/79   Patient Position Supine   Level of Consciousness Alert (0)   MEWS Score 1   Patient Currently in Pain No   Oxygen Therapy   SpO2 91 %   O2 Device None (Room air)   AM assessment completed, see flow sheet. Pt is alert and oriented. Vital signs are WNL. Dyspnea at rest and with exertion. Not requiring 02. Dry sounding cough with thick sputum production. Cough is mostly constant. Sputum is light yellow in color with blood tinges noted, will make MD aware. No further complaints voiced. Pt denies needs at this time. SR up x 2, and bed in low position. Call light is within reach.

## 2021-08-06 NOTE — PROGRESS NOTES
Clarified with Rodrick Araujo RPH that APTT is not needed at this time since heparin gtt was discontinued and pt was placed on eliquis.

## 2021-08-06 NOTE — PROGRESS NOTES
RESPIRATORY THERAPY ASSESSMENT    Name:  Ede Record Number:  [id number]  Age: 48 y.o. Gender: male  : 1970  Today's Date:  2021  Room:  /0306-01    Assessment     Is the patient being admitted for a COPD or Asthma exacerbation? No   (If yes the patient will be seen every 4 hours for the first 24 hours and then reassessed)    Patient Admission Diagnosis      Allergies  No Known Allergies    Minimum Predicted Vital Capacity:     na          Actual Vital Capacity:      na              Pulmonary History:No history  Home Oxygen Therapy:  room air  Home Respiratory Therapy:None   Current Respiratory Therapy:  combivent  Treatment Type: MDI  Medications: Albuterol/Ipratropium    Respiratory Severity Index(RSI)   Patients with orders for inhalation medications, oxygen, or any therapeutic treatment modality will be placed on Respiratory Protocol. They will be assessed with the first treatment and at least every 72 hours thereafter. The following severity scale will be used to determine frequency of treatment intervention. Smoking History: No Smoking History = 0    Social History  Social History     Tobacco Use    Smoking status: Former Smoker     Types: Cigarettes     Quit date:      Years since quittin.6    Smokeless tobacco: Never Used   Vaping Use    Vaping Use: Never used   Substance Use Topics    Alcohol use: No    Drug use: No       Recent Surgical History: None = 0  Past Surgical History  Past Surgical History:   Procedure Laterality Date    COCCYX FRACTURE SURGERY         Level of Consciousness: Alert, Oriented, and Cooperative = 0    Level of Activity: Walking with assistance = 1    Respiratory Pattern: Regular Pattern; RR 8-20 = 0    Breath Sounds: Diminshed bilaterally and/or crackles = 2    Sputum  Sputum Color: Yellow, Other (Comment) (blood tinged ), Tenacity:  Thick, Sputum How Obtained: Spontaneous cough  Cough: Strong, spontaneous, non-productive = 0    Vital Signs   BP (!) 156/79   Pulse 93   Temp 98.4 °F (36.9 °C) (Oral)   Resp 19   Ht 5' 10\" (1.778 m)   Wt 238 lb 3.2 oz (108 kg)   SpO2 90%   BMI 34.18 kg/m²   SPO2 (COPD values may differ): Greater than or equal to 92% on room air = 0    Peak Flow (asthma only): not applicable = 0    RSI: 5-6 = Q4hr PRN (every four hours as needed) for dyspnea        Plan       Goals: medication delivery, mobilize retained secretions, volume expansion and improve oxygenation    Patient/caregiver was educated on the proper method of use for Respiratory Care Devices:  Yes      Level of patient/caregiver understanding able to:   ? Verbalize understanding   ? Demonstrate understanding       ? Teach back        ? Needs reinforcement       ? No available caregiver               ? Other:     Response to education:  Excellent     Is patient being placed on Home Treatment Regimen? No     Does the patient have everything they need prior to discharge? Yes     Comments: chart reviewed, patient assessed    Plan of Care: combivent q4prn per rsi    Electronically signed by Elvira Winn RCP on 8/6/2021 at 5:16 PM    Respiratory Protocol Guidelines     1. Assessment and treatment by Respiratory Therapy will be initiated for medication and therapeutic interventions upon initiation of aerosolized medication. 2. Physician will be contacted for respiratory rate (RR) greater than 35 breaths per minute. Therapy will be held for heart rate (HR) greater than 140 beats per minute, pending direction from physician. 3. Bronchodilators will be administered via Metered Dose Inhaler (MDI) with spacer when the following criteria are met:  a. Alert and cooperative     b. HR < 140 bpm  c. RR < 30 bpm                d. Can demonstrate a 2-3 second inspiratory hold  4. Bronchodilators will be administered via Hand Held Nebulizer [facility]) to patients when ANY of the following criteria are met  a. Incognizant or uncooperative          b.  Patients treated with HHN at Home        c. Unable to demonstrate proper use of MDI with spacer     d. RR > 30 bpm   5. Bronchodilators will be delivered via Metered Dose Inhaler (MDI), HHN, Aerogen to intubated patients on mechanical ventilation. 6. Inhalation medication orders will be delivered and/or substituted as outlined below. Aerosolized Medications Ordering and Administration Guidelines:    1. All Medications will be ordered by a physician, and their frequency and/or modality will be adjusted as defined by the patients Respiratory Severity Index (RSI) score. 2. If the patient does not have documented COPD, consider discontinuing anticholinergics when RSI is less than 9.  3. If the bronchospasm worsens (increased RSI), then the bronchodilator frequency can be increased to a maximum of every 4 hours. If greater than every 4 hours is required, the physician will be contacted. 4. If the bronchospasm improves, the frequency of the bronchodilator can be decreased, based on the patient's RSI, but not less than home treatment regimen frequency. 5. Bronchodilator(s) will be discontinued if patient has a RSI less than 9 and has received no scheduled or as needed treatment for 72  Hrs. Patients Ordered on a Mucolytic Agent:    1. Must always be administered with a bronchodilator. 2. Discontinue if patient experiences worsened bronchospasm, or secretions have lessened to the point that the patient is able to clear them with a cough. Anti-inflammatory and Combination Medications:    1. If the patient lacks prior history of lung disease, is not using inhaled anti-inflammatory medication at home, and lacks wheezing by examination or by history for at least 24 hours, contact physician for possible discontinuation.

## 2021-08-06 NOTE — PLAN OF CARE
48 yoM p/w SOB/NICOLE, myalgias, fatigue. Sx began ~7/22. He did not seek w/u at that time. Sx worsening. Sats stable on RA. Found to be COVID + here. EKG performed in ED but unavailable in Epic for review at time of admit. Outside of Rx use time range. Not requiring O2. RLL PE 2/2 COVID  Hyperglycemia, 301. New Dx?

## 2021-08-06 NOTE — ED NOTES
Patient ambulated in room. Patient able to support self with steady gait. Patient returned to bed without issues.       Rosaura Lua RN  08/06/21 2365

## 2021-08-06 NOTE — PROGRESS NOTES
Per High-dose heparin drip protocol:    Wt = 99.8 kg  IBW = 75 kg    Heparin to be dosed on adjusted body wt = 84 kg    Baseline aPTT = 31.1 sec  Bolus dose = 6700 units  (80 units/kg)    Initiate drip at 18 units/kg/h = 1510 units/h (15.1ml/h)     First aPTT ordered for 8/6 @ 1100    Adjust drip as needed per the following protocol:    aPTT < 45 Heparin 80 units/kg bolus  Increase infusion by 4 units/kg/hr = 3.4 ml/h  aPTT 45 - 59.9   Heparin 40 units/kg bolus Increase infusion by 2 units/kg/hr = 1.7 ml/h  aPTT 60 - 90 No bolus No change   aPTT 90.1 - 97.5   No bolus Decrease infusion by 1 units/kg/hr = 0.8 ml/h  aPTT 97.6-105    Hold heparin for 1 hour Decrease infusion by 2 units/kg/hr = 1.7 ml/h  aPTT > 105  Hold heparin for 1 hour Decrease infusion by 3 units/kg/hr = 2.5 ml/h    Obtain aPTT 6-8 hours after bolus and 6 hours after any dose change until two consecutive therapeutic aPTT are achieved- then daily.

## 2021-08-06 NOTE — CONSULTS
Patient is being seen at the request of Magen Brasher MD  for a consultation for COVID-19 NP    HISTORY OF PRESENT ILLNESS:   48years old presented with shortness of breath started 7/22. Progressive. Moderate to severe. Dyspnea worse with exertion and better with resting. Associated with cough. Yellow sputum. No hemoptysis. Found to be Covid positive. Low-grade fever. CTPA 8/6/2021 showed right lower lobe PE. No smoking. No history of DVT/PE. No home O2 or IBD. PAST MEDICAL HISTORY:  Past Medical History:   Diagnosis Date    Diabetes mellitus ([address]     Pneumonia      PAST SURGICAL HISTORY:  Past Surgical History:   Procedure Laterality Date    COCCYX FRACTURE SURGERY         FAMILY HISTORY:  No lung cancer       SOCIAL HISTORY:   reports that he quit smoking about 27 years ago. His smoking use included cigarettes. He has never used smokeless tobacco.    Scheduled Meds:   sodium chloride flush  5-40 mL Intravenous 2 times per day    insulin lispro  0-6 Units Subcutaneous TID WC    insulin lispro  0-3 Units Subcutaneous Nightly    Vitamin D  2,000 Units Oral Daily    albuterol-ipratropium  1 puff Inhalation Q4H While awake    apixaban  10 mg Oral BID     Continuous Infusions:   dextrose      sodium chloride      sodium chloride 75 mL/hr at 08/06/21 0956     PRN Meds:  ondansetron, albuterol sulfate HFA, glucose, dextrose, glucagon (rDNA), dextrose, sodium chloride flush, sodium chloride, polyethylene glycol, acetaminophen **OR** acetaminophen, guaiFENesin-dextromethorphan, benzonatate    ALLERGIES:  Patient has No Known Allergies.     REVIEW OF SYSTEMS:  Constitutional: + Fever  HENT: Negative for sore throat  Eyes: Negative for redness   Respiratory: + Dyspnea, cough  Cardiovascular: Negative for chest pain  Gastrointestinal: Negative for vomiting, diarrhea   Genitourinary: Negative for hematuria   Musculoskeletal: + Arthralgias   Skin: Negative for rash  Neurological: Negative for syncope  Hematological: Negative for adenopathy  Psychiatric/Behavorial: Negative for anxiety    PHYSICAL EXAM:  Blood pressure (!) 156/79, pulse 93, temperature 98.4 °F (36.9 °C), temperature source Oral, resp. rate 19, height 5' 10\" (1.778 m), weight 238 lb 3.2 oz (108 kg), SpO2 91 %.' on RA  Gen: + Distress. Ill-appearing  Eyes: PERRL. No sclera icterus. No conjunctival injection. ENT: No discharge. Pharynx clear. Neck: Trachea midline. No obvious mass. Resp: + Accessory muscle use. Bilateral crackles. No wheezes. No rhonchi. No dullness on percussion. CV: Regular rate. Regular rhythm. No murmur or rub. No edema. GI: Non-tender. Non-distended. No hernia. Skin: Warm and dry. No nodule on exposed extremities. Lymph: No cervical LAD. No supraclavicular LAD. M/S: No cyanosis. No joint deformity. No clubbing. Neuro: Awake. Alert. Moves all four extremities. Psych: Oriented x 3. No anxiety. LABS:  CBC:   Recent Labs     08/06/21 0030   WBC 13.9*   HGB 14.3   HCT 41.9   MCV 85.4        BMP:   Recent Labs     08/06/21 0030      K 3.8      CO2 22   BUN 16   CREATININE 0.7*     LIVER PROFILE:   Recent Labs     08/06/21 0030   AST 41*   ALT 50*   BILITOT 0.7   ALKPHOS 102     PT/INR: No results for input(s): PROTIME, INR in the last 72 hours. APTT:   Recent Labs     08/06/21 0030   APTT 31.1     UA:No results for input(s): NITRITE, COLORU, PHUR, LABCAST, WBCUA, RBCUA, MUCUS, TRICHOMONAS, YEAST, BACTERIA, CLARITYU, SPECGRAV, LEUKOCYTESUR, UROBILINOGEN, BILIRUBINUR, BLOODU, GLUCOSEU, AMORPHOUS in the last 72 hours. Invalid input(s): KETONESU  No results for input(s): PHART, IAY1FNA, PO2ART in the last 72 hours. CTPA 8/6 imaging was reviewed by me and showed   1. Right lower lobe pulmonary emboli. 2. Commonly reported imaging features of COVID-19 pneumonia are present. 3. Coronary artery disease.      ASSESSMENT:  · Acute hypoxemic respiratory failure  · COVID-19 viral pneumonia  · Abnormal CT chest with diffuse bilateral GGO-cannot exclude superimposed bacterial pneumonia  · RLE PE    PLAN:  Supplemental oxygen to maintain SaO2 >92%; wean as tolerated  Given clinical and radiographic findings, concern patient could deteriorate rapidly and I recommend Covid specific treatment, including dexamethasone, remdesivir, CCP. We will consider tocilizumab if progressive hypoxemia. Continuous pulse oximetry  Droplet plus isolation (surgical mask, eye protection, gown, glove)  Ceftriaxone and Zithromax  Procalcitonin  Urine legionella and streptococcal pneumonia antige  Nasopharyngeal swab for influenza A&B  Moved to negative pressure room in case needs aerosolized procedure  Avoid NEBs as able-albuterol MDI strongly preferred   Dexamethasone 6 mg for 10 days or until discharge, whichever is shorter  Remdesivir 200 mg IV on day 1 followed by 100 mg daily for 5 days. Monitor LFTs, renal and CBC daily while on medication. 2 units of convalescence plasma- risks, benefits and alteratives were discussed with patient.    Eliquis

## 2021-08-06 NOTE — CARE COORDINATION
Case Management Assessment  Initial Evaluation      Patient Name: Viviane Diallo  YOB: 1970  Diagnosis: Acute pulmonary embolism without acute cor pulmonale, unspecified pulmonary embolism type ([facility] [I26.99]  Pulmonary embolism associated with COVID-19 ([facility] [U07.1, I26.99]  COVID-19 [U07.1]  Date / Time: 8/5/2021 11:26 PM    Admission status/Date: 08/06/2021 Inpatient   Chart Reviewed: Yes      Patient Interviewed: Yes   Family Interviewed:  No      Hospitalization in the last 30 days:  No      Health Care Decision Maker :   Primary Decision Maker: Rahel Noriega - Tracee - [phone]    (CM - must 1st enter selection under Navigator - emergency contact- [place] Relationship and pick relationship)   Who do you trust or have selected to make healthcare decisions for you      Met with: pt   Interview conducted  (bedside/phone): phone call     Current PCP: None; information placed on the AVS for the [facility]. Pt did not want a referral at this time    Financial  None; referral to Andreea Lea in [employer]. He stated his fiancee is working on insurance.    Precert required for SNF : N          3 night stay required -  N    ADLS  Support Systems/Care Needs: Spouse/Significant Other, Family Members  Transportation: self    Meal Preparation: self    Housing  Living Arrangements: pt lives at home with family  Steps: 0  Intent for return to present living arrangements: Yes  Identified Issues: [id number] [facility] with Home Health Care : No Agency:(Services)  Type of Home Care Services: None  Passport/Waiver : No  :                      Phone Number:    Passport/Waiver Services: n/a          Durable Medical Equiptment   DME Provider: n/a  Equipment:   Walker___Cane___RTS___ BSC___Shower Chair___Hospital Bed___W/C____Other________  02 at ____Liter(s)---wears(frequency)_______ HHN ___ CPAP___ BiPap___   N/A___x_      Home O2 Use :  No If No for home O2---if presently on O2 during hospitalization:  No  if yes CM to follow for potential DC O2 need  Informed of need for care provider to bring portable home O2 tank on day of discharge for nursing to connect prior to leaving:   Not Indicated  Verbalized agreement/Understanding:   Not Indicated    Community Service Affiliation  Dialysis:  No    · Agency:  · Location:  · Dialysis Schedule:  · Phone:   · Fax: Other Community Services: n/a    DISCHARGE PLAN: Explained Case Management role/services. Chart review completed. Called and spoke with pt via bedside phone due to covid isolation. Pt stated that he is independent at home and plans on returning home. He has no DME, o2, HHC or community services. He denied needs or questions for CM. CM will follow as pt has covid. Please notify CM if needs or concerns arise.       Addendum at 3:06pm: Anamika Leong with financial counseling stated pt is a \"tiny bit\" over income for medicaid so he is financial assistance only

## 2021-08-06 NOTE — PROGRESS NOTES
Pt resting in bed. Says that he got a nap and is feeling much better. Cough is controlled and sputum is thinner and easier to cough up. All needs and call light within reach.

## 2021-08-06 NOTE — PLAN OF CARE
Problem: Airway Clearance - Ineffective  Goal: Achieve or maintain patent airway  8/6/2021 9945 by Lily Salazar RN  Outcome: Met This Shift     Problem:  Body Temperature -  Risk of, Imbalanced  Goal: Will regain or maintain usual level of consciousness  8/6/2021 0632 by Lily Salazar RN  Outcome: Met This Shift  Goal: Complications related to the disease process, condition or treatment will be avoided or minimized  8/6/2021 8109 by Lily Salazar RN  Outcome: Met This Shift     Problem: Isolation Precautions - Risk of Spread of Infection  Goal: Prevent transmission of infection  8/6/2021 1544 by Lily Salazar RN  Outcome: Met This Shift     Problem: Nutrition Deficits  Goal: Optimize nutritional status  8/6/2021 2538 by Lily Salazar RN  Outcome: Met This Shift     Problem: Risk for Fluid Volume Deficit  Goal: Maintain normal heart rhythm  8/6/2021 0632 by Lily Salazar RN  Outcome: Met This Shift  Goal: Maintain absence of muscle cramping  8/6/2021 0632 by Lily Salazar RN  Outcome: Met This Shift  Goal: Maintain normal serum potassium, sodium, calcium, phosphorus, and pH  8/6/2021 0632 by Lily Salazar RN  Outcome: Met This Shift     Problem: Fatigue  Goal: Verbalize increase energy and improved vitality  8/6/2021 8166 by Lily Salazar RN  Outcome: Met This Shift     Problem: Patient Education: Go to Patient Education Activity  Goal: Patient/Family Education  8/6/2021 3036 by Lily Salazar RN  Outcome: Met This Shift

## 2021-08-06 NOTE — PROGRESS NOTES
MD made aware that pt is coughing up thick yellow sputum that is blood tinged. PRN tessalon and robitussin given to help with continuous cough.

## 2021-08-06 NOTE — H&P
Hospital Medicine History & Physical      PCP: No primary care provider on file. Date of Admission: 8/5/2021    Date of Service: Pt seen/examined on 8/6/2021     Chief Complaint:    Chief Complaint   Patient presents with    Concern For COVID-19     Pt states he has had SOB, cough, loss of smell since 7/22. States it has worsened, pt O2 90-93% on RA during triage. History Of Present Illness: The patient is a 48 y.o. male with DM2 who presented to [facility] ED with complaint of shortness of breath. Symptoms started 7/22. He and his fiance works at [employer] and she was positive 2 weeks ago. He started with fever, chills about 2 weeks ago and his fever resolved by 10 th day . He did not seek eval or treatment initially. he started with cough , exertional dyspnea and clear sputum about 5 days ago . Denies any chest pain or previous lung issues      Symptoms progressively worsened. Came to ER for eval where he was found to be COVID 19 positive. A CT chest revealed  RLL pulmonary emboli. He was not hypoxic. Admitted for further care. Remote smoker, quit at age 25 . No previous dvt or PE  Reports some pedal edema being sedentary     Past Medical History:        Diagnosis Date    Diabetes mellitus ([facility] [address]     Pneumonia        Past Surgical History:        Procedure Laterality Date    COCCYX FRACTURE SURGERY         Medications Prior to Admission:    Prior to Admission medications    Medication Sig Start Date End Date Taking? Authorizing Provider   ibuprofen (IBU) 400 MG tablet Take 1 tablet by mouth every 6 hours as needed for Pain 9/5/18   Kyra Hyman MD       Allergies:  Patient has no known allergies. Social History:  The patient currently lives  at home     TOBACCO:   reports that he quit smoking about 27 years ago. His smoking use included cigarettes. He has never used smokeless tobacco.  ETOH:   reports no history of alcohol use.       Family History:   Positive as follows:    History reviewed. No pertinent family history. REVIEW OF SYSTEMS:       Constitutional: +for fever , malaise, weakness  HENT: Negative for sore throat   Eyes: Negative for redness   Respiratory: +for dyspnea, cough   Cardiovascular: Negative for chest pain   Gastrointestinal: Negative for vomiting, diarrhea   Genitourinary: Negative for hematuria   Musculoskeletal: Negative for arthralgias   Skin: Negative for rash   Neurological: Negative for syncope   Hematological: Negative for adenopathy   Psychiatric/Behavorial: Negative for anxiety    PHYSICAL EXAM:    BP (!) 158/92   Pulse 91   Temp 100.1 °F (37.8 °C) (Oral)   Resp 16   Ht 5' 10\" (1.778 m)   Wt 238 lb 3.2 oz (108 kg)   SpO2 92%   BMI 34.18 kg/m²         General: middle aged male, sick appearing   Awake, alert and oriented. Appears to be not in any distress  Mucous Membranes:  Pink , anicteric  Neck: No JVD, no carotid bruit, no thyromegaly  Chest:  Clear to auscultation bilaterally, diminished in bases with minimal crackles. Frequent cough noted   Cardiovascular:  RRR S1S2 heard, no murmurs or gallops  Abdomen:  Soft, undistended, non tender, no organomegaly, BS present  Extremities: trace edema to both feet. Distal pulses well felt  Neurological : grossly normal        CBC:   Recent Labs     08/06/21 0030   WBC 13.9*   HGB 14.3   HCT 41.9   MCV 85.4        BMP:   Recent Labs     08/06/21 0030      K 3.8      CO2 22   BUN 16   CREATININE 0.7*     LIVER PROFILE:   Recent Labs     08/06/21 0030   AST 41*   ALT 50*   BILITOT 0.7   ALKPHOS 102     PT/INR: No results for input(s): PROTIME, INR in the last 72 hours. APTT:   Recent Labs     08/06/21 0030   APTT 31.1     UA:No results for input(s): NITRITE, COLORU, PHUR, LABCAST, WBCUA, RBCUA, MUCUS, TRICHOMONAS, YEAST, BACTERIA, CLARITYU, SPECGRAV, LEUKOCYTESUR, UROBILINOGEN, BILIRUBINUR, BLOODU, GLUCOSEU, AMORPHOUS in the last 72 hours.     Invalid input(s): Derrill Nissen CARDIAC ENZYMES  Recent Labs     08/06/21  0030   TROPONINI <0.01       U/A:  No results found for: NITRITE, COLORU, WBCUA, RBCUA, MUCUS, BACTERIA, CLARITYU, SPECGRAV, LEUKOCYTESUR, BLOODU, GLUCOSEU, AMORPHOUS    ABG  No results found for: ACJ1AWO, BEART, F9DNOMOF, PHART, THGBART, DUF9JUF, PO2ART, MAA2VYP    CULTURES  COVID 19, PCR: detected     EKG:  I have reviewed the EKG with the following interpretation:   Not done    RADIOLOGY    CT CHEST PULMONARY EMBOLISM W CONTRAST   Final Result   1. Right lower lobe pulmonary emboli. 2.  Commonly reported imaging features of COVID-19 pneumonia are present. Other processes such as influenza pneumonia and organizing pneumonia, as can   be seen with drug toxicity and connective tissue disease, can cause a similar   imaging pattern. PneTyp   3. Coronary artery disease. Critical results were called by Dr. Sheryl Hooper MD to Dr. Ananda Pennington on 8/6/2021   at 03:29. XR CHEST PORTABLE   Final Result   Commonly reported imaging features of COVID-19 pneumonia are present. Other   processes such as influenza pneumonia and organizing pneumonia, as can be   seen with drug toxicity and connective tissue disease, can cause a similar   imaging pattern. PneTyp           ASSESSMENT/PLAN:    #Acute right lower lobe pulmonary emboli  - suspect 2/2 COVID 19  - currently on heparin gtt- VS are stable  - pulm cs   - can change to eliquis 10 mg bid   - need AC x 3 months      #COVID 19 Pneumonia  - symptoms started 7/22. Not hypoxic. Supportive care  - cough suppressant for now  - monitor spo2   - can start steroids    #Hyperglycemia   #History of DM2 per EMR  - check A1c. No DM meds on list  - use SSI for now     #Mild transaminitis  - could be related to COVID 19, recommended recheck in 2 weeks with PCP    #CAD per CT chest  - remote hx of smoking  - need testing as outpt    DVT Prophylaxis: Heparin   Diet: ADULT DIET;  Regular; 4 carb choices (60 gm/meal)  Code Status: Full

## 2021-08-06 NOTE — PROGRESS NOTES
Spoke with Mireya Hope from answering service re: patient refusing his plasma and remdesivir per RN to give Dr Sanjuana Sauer a message Gretchen Watson [phone] 08-6-21

## 2021-08-06 NOTE — CONSULTS
Pharmacy to dose remdesivir per Dr. Ashish Kidd. Please give remdesivir 200 mg ivpb x1 dose now followed by 100 mg ivpb q24h x 4 doses. [address] Ph.  8/6/2021 3:48 PM

## 2021-08-06 NOTE — ACP (ADVANCE CARE PLANNING)
Advance Care Planning   Healthcare Decision Maker:    Primary Decision Maker: Nai Easley - [phone]    Click here to complete Healthcare Decision Makers including selection of the Healthcare Decision Maker Relationship (ie \"Primary\"). Pt stated he wanted his first cousin Radha Maddox to be his primary decision maker if he is unable to make his medical decisions. He is aware that without POA papers in place, it would fall to his next of kin. He stated that he is not  and has one adult child. He is aware that his adult child would be his next of kin. He stated agreement/understanding and declined assistance with POA/Advance Directives at this time. He stated it was scary to think about.  Offered supportive listening throughout conversation and pt aware to notify RN if he decides he wants assistance with advance directives while in the hospital.

## 2021-08-06 NOTE — PROGRESS NOTES
Pt with some reserves about taking steroid and CCP. States that he is feeling better just after getting a nap. He would like to think this over some for now. Will accept taking antibiotic. If pt would not like COVID treatment, writer will let pulmonology know.

## 2021-08-06 NOTE — FLOWSHEET NOTE
08/06/21 0530   Vital Signs   Temp 100.1 °F (37.8 °C)   Temp Source Oral   Pulse 91   Heart Rate Source Monitor   Resp 16   BP (!) 158/92   Patient Position Sitting   Level of Consciousness Alert (0)   MEWS Score 1   Oxygen Therapy   SpO2 92 %   O2 Device None (Room air)   Height and Weight   Weight 238 lb 3.2 oz (108 kg)   Weight Method Bed scale   BMI (Calculated) 34.2     Patient admitted to room 306 from ED. Patient oriented to room, call light, bed rails, phone, lights and bathroom. Patient instructed about the schedule of the day including: vital sign frequency, lab draws, possible tests, frequency of MD and staff rounds, daily weights, I &O's and prescribed diet. Telemetry box in place, patient aware of placement and reason. Bed locked, in lowest position, side rails up 2/4, call light within reach. Recliner Assessment  Patient is able to demonstrate the ability to move from a reclining position to an upright position within the recliner. 4 Eyes Skin Assessment     The patient is being assess for   Admission    I agree that 2 RN's have performed a thorough Head to Toe Skin Assessment on the patient. ALL assessment sites listed below have been assessed. Areas assessed for pressure by both nurses:   [x]   Head, Face, and Ears   [x]   Shoulders, Back, and Chest, Abdomen  [x]   Arms, Elbows, and Hands   [x]   Coccyx, Sacrum, and Ischium  [x]   Legs, Feet, and Heels                **SHARE this note so that the co-signing nurse is able to place an eSignature**    Co-signer eSignature: Electronically signed by Lorelei Dennis RN on 8/6/21 at 6:35 AM EDT    Does the Patient have Skin Breakdown related to pressure?   No          Pete Prevention initiated:  NA   Wound Care Orders initiated:  NA      [facility] nurse consulted for Pressure Injury (Stage 3,4, Unstageable, DTI, NWPT, Complex wounds)and New or Established Ostomies:  NA      Primary Nurse eSignature: Electronically signed by Felipe Medrano RN on 8/6/21 at 6:07 AM EDT

## 2021-08-07 LAB
A/G RATIO: 0.7 (ref 1.1–2.2)
ALBUMIN SERPL-MCNC: 2.7 G/DL (ref 3.4–5)
ALP BLD-CCNC: 73 U/L (ref 40–129)
ALT SERPL-CCNC: 47 U/L (ref 10–40)
ANION GAP SERPL CALCULATED.3IONS-SCNC: 13 MMOL/L (ref 3–16)
AST SERPL-CCNC: 30 U/L (ref 15–37)
BASOPHILS ABSOLUTE: 0 K/UL (ref 0–0.2)
BASOPHILS RELATIVE PERCENT: 0.2 %
BILIRUB SERPL-MCNC: 0.6 MG/DL (ref 0–1)
BUN BLDV-MCNC: 16 MG/DL (ref 7–20)
CALCIUM SERPL-MCNC: 8.4 MG/DL (ref 8.3–10.6)
CHLORIDE BLD-SCNC: 106 MMOL/L (ref 99–110)
CO2: 18 MMOL/L (ref 21–32)
CREAT SERPL-MCNC: 0.6 MG/DL (ref 0.9–1.3)
EOSINOPHILS ABSOLUTE: 0 K/UL (ref 0–0.6)
EOSINOPHILS RELATIVE PERCENT: 0.1 %
ESTIMATED AVERAGE GLUCOSE: 266.1 MG/DL
GFR AFRICAN AMERICAN: >60
GFR NON-AFRICAN AMERICAN: >60
GLOBULIN: 4 G/DL
GLUCOSE BLD-MCNC: 261 MG/DL (ref 70–99)
GLUCOSE BLD-MCNC: 274 MG/DL (ref 70–99)
GLUCOSE BLD-MCNC: 274 MG/DL (ref 70–99)
GLUCOSE BLD-MCNC: 307 MG/DL (ref 70–99)
GLUCOSE BLD-MCNC: 352 MG/DL (ref 70–99)
HBA1C MFR BLD: 10.9 %
HCT VFR BLD CALC: 39.8 % (ref 40.5–52.5)
HEMOGLOBIN: 13.7 G/DL (ref 13.5–17.5)
L. PNEUMOPHILA SEROGP 1 UR AG: NORMAL
LYMPHOCYTES ABSOLUTE: 1.6 K/UL (ref 1–5.1)
LYMPHOCYTES RELATIVE PERCENT: 15 %
MCH RBC QN AUTO: 29.5 PG (ref 26–34)
MCHC RBC AUTO-ENTMCNC: 34.4 G/DL (ref 31–36)
MCV RBC AUTO: 85.7 FL (ref 80–100)
MONOCYTES ABSOLUTE: 0.8 K/UL (ref 0–1.3)
MONOCYTES RELATIVE PERCENT: 8.1 %
NEUTROPHILS ABSOLUTE: 8 K/UL (ref 1.7–7.7)
NEUTROPHILS RELATIVE PERCENT: 76.6 %
PDW BLD-RTO: 12.8 % (ref 12.4–15.4)
PERFORMED ON: ABNORMAL
PLATELET # BLD: 283 K/UL (ref 135–450)
PMV BLD AUTO: 9.1 FL (ref 5–10.5)
POTASSIUM REFLEX MAGNESIUM: 4.3 MMOL/L (ref 3.5–5.1)
RBC # BLD: 4.65 M/UL (ref 4.2–5.9)
SODIUM BLD-SCNC: 137 MMOL/L (ref 136–145)
TOTAL PROTEIN: 6.7 G/DL (ref 6.4–8.2)
WBC # BLD: 10.4 K/UL (ref 4–11)

## 2021-08-07 PROCEDURE — 85025 COMPLETE CBC W/AUTO DIFF WBC: CPT

## 2021-08-07 PROCEDURE — 2580000003 HC RX 258: Performed by: INTERNAL MEDICINE

## 2021-08-07 PROCEDURE — 36415 COLL VENOUS BLD VENIPUNCTURE: CPT

## 2021-08-07 PROCEDURE — 6370000000 HC RX 637 (ALT 250 FOR IP): Performed by: INTERNAL MEDICINE

## 2021-08-07 PROCEDURE — 2060000000 HC ICU INTERMEDIATE R&B

## 2021-08-07 PROCEDURE — 99233 SBSQ HOSP IP/OBS HIGH 50: CPT | Performed by: INTERNAL MEDICINE

## 2021-08-07 PROCEDURE — 6360000002 HC RX W HCPCS: Performed by: INTERNAL MEDICINE

## 2021-08-07 PROCEDURE — 80053 COMPREHEN METABOLIC PANEL: CPT

## 2021-08-07 PROCEDURE — 99232 SBSQ HOSP IP/OBS MODERATE 35: CPT | Performed by: INTERNAL MEDICINE

## 2021-08-07 RX ORDER — AMLODIPINE BESYLATE 5 MG/1
5 TABLET ORAL DAILY
Qty: 30 TABLET | Refills: 0 | Status: SHIPPED | OUTPATIENT
Start: 2021-08-08 | End: 2021-10-01

## 2021-08-07 RX ORDER — BLOOD-GLUCOSE METER
1 KIT MISCELLANEOUS DAILY
Qty: 1 KIT | Refills: 0 | Status: SHIPPED | OUTPATIENT
Start: 2021-08-07

## 2021-08-07 RX ORDER — LANCETS
1 EACH MISCELLANEOUS DAILY
Qty: 100 EACH | Refills: 0 | Status: SHIPPED | OUTPATIENT
Start: 2021-08-07

## 2021-08-07 RX ORDER — BENZONATATE 100 MG/1
100 CAPSULE ORAL 3 TIMES DAILY PRN
Qty: 20 CAPSULE | Refills: 0 | Status: SHIPPED | OUTPATIENT
Start: 2021-08-07 | End: 2021-08-14

## 2021-08-07 RX ORDER — GLUCOSAMINE HCL/CHONDROITIN SU 500-400 MG
CAPSULE ORAL
Qty: 100 STRIP | Refills: 0 | Status: SHIPPED | OUTPATIENT
Start: 2021-08-07 | End: 2021-09-11

## 2021-08-07 RX ORDER — GUAIFENESIN/DEXTROMETHORPHAN 100-10MG/5
5 SYRUP ORAL EVERY 4 HOURS PRN
Qty: 120 ML | Refills: 0
Start: 2021-08-07 | End: 2021-08-17

## 2021-08-07 RX ORDER — AMLODIPINE BESYLATE 5 MG/1
5 TABLET ORAL DAILY
Status: DISCONTINUED | OUTPATIENT
Start: 2021-08-07 | End: 2021-08-09 | Stop reason: HOSPADM

## 2021-08-07 RX ORDER — INSULIN GLARGINE 100 [IU]/ML
10 INJECTION, SOLUTION SUBCUTANEOUS ONCE
Status: COMPLETED | OUTPATIENT
Start: 2021-08-07 | End: 2021-08-07

## 2021-08-07 RX ORDER — DEXAMETHASONE 6 MG/1
6 TABLET ORAL
Qty: 8 TABLET | Refills: 0 | Status: SHIPPED | OUTPATIENT
Start: 2021-08-07 | End: 2021-08-15

## 2021-08-07 RX ADMIN — CEFTRIAXONE SODIUM 1000 MG: 1 INJECTION, POWDER, FOR SOLUTION INTRAMUSCULAR; INTRAVENOUS at 16:52

## 2021-08-07 RX ADMIN — INSULIN LISPRO 3 UNITS: 100 INJECTION, SOLUTION INTRAVENOUS; SUBCUTANEOUS at 20:36

## 2021-08-07 RX ADMIN — INSULIN GLARGINE 10 UNITS: 100 INJECTION, SOLUTION SUBCUTANEOUS at 09:59

## 2021-08-07 RX ADMIN — APIXABAN 10 MG: 5 TABLET, FILM COATED ORAL at 20:36

## 2021-08-07 RX ADMIN — SODIUM CHLORIDE: 9 INJECTION, SOLUTION INTRAVENOUS at 08:26

## 2021-08-07 RX ADMIN — AZITHROMYCIN 500 MG: 250 TABLET, FILM COATED ORAL at 08:24

## 2021-08-07 RX ADMIN — DEXAMETHASONE SODIUM PHOSPHATE 6 MG: 10 INJECTION, SOLUTION INTRAMUSCULAR; INTRAVENOUS at 16:53

## 2021-08-07 RX ADMIN — APIXABAN 10 MG: 5 TABLET, FILM COATED ORAL at 08:24

## 2021-08-07 RX ADMIN — AMLODIPINE BESYLATE 5 MG: 5 TABLET ORAL at 08:24

## 2021-08-07 RX ADMIN — Medication 2000 UNITS: at 08:24

## 2021-08-07 NOTE — PLAN OF CARE
Problem: Airway Clearance - Ineffective  Goal: Achieve or maintain patent airway  8/7/2021 1436 by Rogelio Willard RN  Outcome: Completed  8/7/2021 0508 by Carlo Yi RN  Outcome: Met This Shift     Problem: Gas Exchange - Impaired  Goal: Absence of hypoxia  8/7/2021 1436 by Rogelio Willard RN  Outcome: Completed  8/7/2021 0508 by Carlo Yi RN  Outcome: Ongoing  Goal: Promote optimal lung function  8/7/2021 1436 by Rogelio Willard RN  Outcome: Completed  8/7/2021 0508 by Carlo Yi RN  Outcome: Ongoing     Problem: Breathing Pattern - Ineffective  Goal: Ability to achieve and maintain a regular respiratory rate  8/7/2021 1436 by Rogelio Willard RN  Outcome: Completed  8/7/2021 0508 by Carlo Yi RN  Outcome: Met This Shift

## 2021-08-07 NOTE — PROGRESS NOTES
Patient reports feeling much better today. Not requiring O2. Only complaint is a cough. Shift assessment complete. See flow sheet. Scheduled meds given. See MAR. Patients head-toe complete, VS are logged, active bowel sound noted in all four quadrants. No needs are noted at this time. Call light and bedside table are within reach. The bed is locked and is in the lowest position.

## 2021-08-07 NOTE — DISCHARGE SUMMARY
chest  - remote hx of smoking  - need testing as outpt    Procedures (Please Review Full Report for Details)  None    Consults    Pulmonology    Physical Exam at Discharge:    BP (!) 147/72   Pulse 70   Temp 97 °F (36.1 °C) (Oral)   Resp 17   Ht 6' 2\" (1.88 m)   Wt 239 lb 1.6 oz (108.5 kg)   SpO2 94%   BMI 30.70 kg/m²     General:  Middle aged male Awake, alert and oriented. Appears to be not in any distress  Mucous Membranes:  Pink , anicteric  Neck: No JVD, no carotid bruit, no thyromegaly  Chest:  Clear to auscultation bilaterally,  Improving basilar crackles  Cardiovascular:  RRR S1S2 heard, no murmurs or gallops  Abdomen:  Soft, undistended, non tender, no organomegaly, BS present  Extremities: No edema or cyanosis. Distal pulses well felt  Neurological : grossly normal    CBC:   Recent Labs     08/07/21  0505   WBC 10.4   HGB 13.7   HCT 39.8*   MCV 85.7        BMP:   Recent Labs     08/07/21  0505      K 4.3      CO2 18*   BUN 16   CREATININE 0.6*     LIVER PROFILE:   Recent Labs     08/07/21  0505   AST 30   ALT 47*   BILITOT 0.6   ALKPHOS 73     APTT:   Recent Labs     08/06/21  0030   APTT 31.1     CULTURES    Resp cx: 2+ epithelial cells, 2+ polymorphonuclear WBCs, 3+ GP cocci, 1+ GN leatha    Urine legionella: negative    Blood cx x2: pending    COVID 19 RT PCR: DETECTED    Rapid Influenza A/B: negative      RADIOLOGY    CT CHEST PULMONARY EMBOLISM W CONTRAST   Final Result   1. Right lower lobe pulmonary emboli. 2.  Commonly reported imaging features of COVID-19 pneumonia are present. Other processes such as influenza pneumonia and organizing pneumonia, as can   be seen with drug toxicity and connective tissue disease, can cause a similar   imaging pattern. PneTyp   3. Coronary artery disease. Critical results were called by Dr. Irene Pennington MD to Dr. Linda Justice on 8/6/2021   at 03:29.          XR CHEST PORTABLE   Final Result   Commonly reported imaging features of COVID-19 pneumonia are present. Other   processes such as influenza pneumonia and organizing pneumonia, as can be   seen with drug toxicity and connective tissue disease, can cause a similar   imaging pattern. PneTyp             Discharge Medications     Medication List      START taking these medications    amLODIPine 5 MG tablet  Commonly known as: NORVASC  Take 1 tablet by mouth daily  Start taking on: August 8, 2021     apixaban starter pack 5 MG Tbpk tablet  Commonly known as: ELIQUIS  Take 1 tablet by mouth See Admin Instructions Take 10 mg twice daily x 5 more days ,then 5 mg twice daily x 3 months     benzonatate 100 MG capsule  Commonly known as: TESSALON  Take 1 capsule by mouth 3 times daily as needed for Cough  Notes to patient: As needed. Use as directed. blood glucose test strips  Test two times a day & as needed for symptoms of irregular blood glucose. Dispense sufficient amount for indicated testing frequency plus additional to accommodate PRN testing needs. [employer] Lancets Ultra Thin Misc  1 each by Does not apply route daily     dexamethasone 6 MG tablet  Commonly known as: DECADRON  Take 1 tablet by mouth daily (with breakfast) for 8 days     glucose monitoring kit  1 kit by Does not apply route daily     guaiFENesin-dextromethorphan 100-10 MG/5ML syrup  Commonly known as: ROBITUSSIN DM  Take 5 mLs by mouth every 4 hours as needed for Cough  Notes to patient: As needed. Use as directed. metFORMIN 500 MG tablet  Commonly known as: GLUCOPHAGE  Take 1 tablet by mouth 2 times daily (with meals)        CONTINUE taking these medications    ibuprofen 400 MG tablet  Commonly known as: IBU  Take 1 tablet by mouth every 6 hours as needed for Pain  Notes to patient: As needed. Use as directed.             Where to Get Your Medications      You can get these medications from any pharmacy    Bring a paper prescription for each of these medications  · amLODIPine 5 MG tablet  · apixaban starter pack 5 MG Tbpk tablet  · benzonatate 100 MG capsule  · blood glucose test strips  · [employer] Lancets Ultra Thin Misc  · dexamethasone 6 MG tablet  · glucose monitoring kit  · metFORMIN 500 MG tablet     Information about where to get these medications is not yet available    Ask your nurse or doctor about these medications  · guaiFENesin-dextromethorphan 100-10 MG/5ML syrup           Discharged in stable condition to home. Follow Up: Follow up with PCP in 1 week. More than 30 mts spent.         Ramsey Forde MD 8/19/2021 10:14 AM

## 2021-08-07 NOTE — PROGRESS NOTES
Assessment completed, see flowsheets. Night meds given. FSBS 309, 2 units ss humalog given per mar. Pt with no reports of pain at this time. Bed in lowest position with call light in reach.     Lilian Beard RN

## 2021-08-07 NOTE — PROGRESS NOTES
Pulmonary Progress Note    CC: COVID-19    Subjective:   Declined CCP and remdesivir yesterday. Desaturation 29% with exertion. Shortness of breath is somewhat better        Intake/Output Summary (Last 24 hours) at 8/7/2021 1351  Last data filed at 8/7/2021 1256  Gross per 24 hour   Intake 1132.96 ml   Output --   Net 1132.96 ml       Exam:   BP (!) 161/85   Pulse 89   Temp 98 °F (36.7 °C) (Oral)   Resp 18   Ht 5' 10\" (1.778 m)   Wt 238 lb (108 kg)   SpO2 92%   BMI 34.15 kg/m²  on room air  Gen:  Mild distress. Ill-appearing  Eyes: PERRL. No sclera icterus. No conjunctival injection. ENT: No discharge. Pharynx clear. Neck: Trachea midline. No obvious mass. Resp:  Mild accessory muscle use. Bilateral crackles. No wheezes. No rhonchi. No dullness on percussion. CV: Regular rate. Regular rhythm. No murmur or rub. No edema. GI: Non-tender. Non-distended. No hernia. Skin: Warm and dry. No nodule on exposed extremities. Lymph: No cervical LAD. No supraclavicular LAD. M/S: No cyanosis. No joint deformity. No clubbing. Neuro: Awake. Alert. Moves all four extremities. Psych: Oriented x 3.  No anxiety.        Scheduled Meds:   amLODIPine  5 mg Oral Daily    sodium chloride flush  5-40 mL Intravenous 2 times per day    insulin lispro  0-6 Units Subcutaneous TID WC    insulin lispro  0-3 Units Subcutaneous Nightly    Vitamin D  2,000 Units Oral Daily    apixaban  10 mg Oral BID    dexamethasone  6 mg Intravenous Q24H    cefTRIAXone (ROCEPHIN) IV  1,000 mg Intravenous Q24H    azithromycin  500 mg Oral Daily    remdesivir IVPB  200 mg Intravenous Once    Followed by   Hermelindo Guevara remdesivir IVPB  100 mg Intravenous Q24H     Continuous Infusions:   dextrose      sodium chloride      sodium chloride Stopped (08/07/21 1107)    sodium chloride       PRN Meds:  ondansetron, albuterol sulfate HFA, glucose, dextrose, glucagon (rDNA), dextrose, sodium chloride flush, sodium chloride, polyethylene glycol, acetaminophen **OR** acetaminophen, guaiFENesin-dextromethorphan, benzonatate, sodium chloride, albuterol-ipratropium    Labs:  CBC:   Recent Labs     08/06/21  0030 08/07/21  0505   WBC 13.9* 10.4   HGB 14.3 13.7   HCT 41.9 39.8*   MCV 85.4 85.7    283     BMP:   Recent Labs     08/06/21  0030 08/07/21  0505    137   K 3.8 4.3    106   CO2 22 18*   BUN 16 16   CREATININE 0.7* 0.6*     LIVER PROFILE:   Recent Labs     08/06/21  0030 08/07/21  0505   AST 41* 30   ALT 50* 47*   BILITOT 0.7 0.6   ALKPHOS 102 73     PT/INR: No results for input(s): PROTIME, INR in the last 72 hours. APTT:   Recent Labs     08/06/21  0030   APTT 31.1     UA:No results for input(s): NITRITE, COLORU, PHUR, LABCAST, WBCUA, RBCUA, MUCUS, TRICHOMONAS, YEAST, BACTERIA, CLARITYU, SPECGRAV, LEUKOCYTESUR, UROBILINOGEN, BILIRUBINUR, BLOODU, GLUCOSEU, AMORPHOUS in the last 72 hours. Invalid input(s): KETONESU  No results for input(s): PHART, YHT5NOL, PO2ART in the last 72 hours. Cultures:   8/5 COVID-19 detected  8/6 respiratory  8/6 Legionella    Films:  CTPA 8/6 imaging was reviewed by me and showed   1. Right lower lobe pulmonary emboli. 2. Commonly reported imaging features of COVID-19 pneumonia are present. 3. Coronary artery disease.      ASSESSMENT:  · Acute hypoxemic respiratory failure  · COVID-19 viral pneumonia  · Abnormal CT chest with diffuse bilateral GGO-cannot exclude superimposed bacterial pneumonia  · RLE PE     PLAN:  · Supplemental oxygen to maintain SaO2 >92%; wean as tolerated  · Continuous pulse oximetry  · Droplet plus isolation (surgical mask, eye protection, gown, glove)  · Ceftriaxone and Zithromax day #2  · Follow-up cultures  · Avoid NEBs as able-albuterol MDI strongly preferred   · Dexamethasone 6 mg IV day #2.     · Declined CCP and remdesivir   · Eliquis  · Discussed with internal medicine

## 2021-08-07 NOTE — PROGRESS NOTES
IM Progress Note    Admit Date:  8/5/2021  1    Interval history:  covid 19 infection , PE   Remains on RA  No fevers  Refused Remdesivir     Subjective:  Mr. Ray Lizama seen up in bed, feels much energetic today  Still with blood tinged sputum  Cough improved and wishes to go home today      Objective:   BP (!) 161/85   Pulse 89   Temp 98 °F (36.7 °C) (Oral)   Resp 18   Ht 5' 10\" (1.778 m)   Wt 238 lb (108 kg)   SpO2 92%   BMI 34.15 kg/m²     Intake/Output Summary (Last 24 hours) at 8/7/2021 1049  Last data filed at 8/7/2021 0855  Gross per 24 hour   Intake 832.96 ml   Output --   Net 832.96 ml       Physical Exam:        General:  Awake, alert and oriented. Appears to be not in any distress  Mucous Membranes:  Pink , anicteric  Neck: No JVD, no carotid bruit, no thyromegaly  Chest:  Clear to auscultation bilaterally,  Improving basilar crackles  Cardiovascular:  RRR S1S2 heard, no murmurs or gallops  Abdomen:  Soft, undistended, non tender, no organomegaly, BS present  Extremities: No edema or cyanosis.  Distal pulses well felt  Neurological : grossly normal      Medications:   Scheduled Medications:    amLODIPine  5 mg Oral Daily    sodium chloride flush  5-40 mL Intravenous 2 times per day    insulin lispro  0-6 Units Subcutaneous TID WC    insulin lispro  0-3 Units Subcutaneous Nightly    Vitamin D  2,000 Units Oral Daily    apixaban  10 mg Oral BID    dexamethasone  6 mg Intravenous Q24H    cefTRIAXone (ROCEPHIN) IV  1,000 mg Intravenous Q24H    azithromycin  500 mg Oral Daily    remdesivir IVPB  200 mg Intravenous Once    Followed by   [facility] remdesivir IVPB  100 mg Intravenous Q24H     I   dextrose      sodium chloride      sodium chloride 75 mL/hr at 08/07/21 0826    sodium chloride       ondansetron, albuterol sulfate HFA, glucose, dextrose, glucagon (rDNA), dextrose, sodium chloride flush, sodium chloride, polyethylene glycol, acetaminophen **OR** acetaminophen, guaiFENesin-dextromethorphan, benzonatate, sodium chloride, albuterol-ipratropium    Lab Data:  Recent Labs     08/06/21  0030 08/07/21  0505   WBC 13.9* 10.4   HGB 14.3 13.7   HCT 41.9 39.8*   MCV 85.4 85.7    283     Recent Labs     08/06/21  0030 08/07/21  0505    137   K 3.8 4.3    106   CO2 22 18*   BUN 16 16   CREATININE 0.7* 0.6*     Recent Labs     08/06/21  0030   TROPONINI <0.01       Coagulation:   Lab Results   Component Value Date    APTT 31.1 08/06/2021     Cardiac markers:   Lab Results   Component Value Date    TROPONINI <0.01 08/06/2021         Lab Results   Component Value Date    ALT 47 (H) 08/07/2021    AST 30 08/07/2021    ALKPHOS 73 08/07/2021    BILITOT 0.6 08/07/2021       No results found for: INR, PROTIME          CULTURES  COVID 19, PCR: detected      EKG:  I have reviewed the EKG with the following interpretation:   Not done     RADIOLOGY     CT CHEST PULMONARY EMBOLISM W CONTRAST   Final Result   1. Right lower lobe pulmonary emboli. 2.  Commonly reported imaging features of COVID-19 pneumonia are present. Other processes such as influenza pneumonia and organizing pneumonia, as can   be seen with drug toxicity and connective tissue disease, can cause a similar   imaging pattern. PneTyp   3. Coronary artery disease. Critical results were called by Dr. Quirino Bocanegra MD to Dr. Olmos  on 8/6/2021   at 03:29.           XR CHEST PORTABLE   Final Result   Commonly reported imaging features of COVID-19 pneumonia are present. Other   processes such as influenza pneumonia and organizing pneumonia, as can be   seen with drug toxicity and connective tissue disease, can cause a similar   imaging pattern. PneTyp              ASSESSMENT/PLAN:     #Acute right lower lobe pulmonary emboli  - suspect 2/2 COVID 19  - started on heparin gtt and transitioned to Eliquis   - no hypoxia  - mild hemoptysis not any worse with eliquis   - need AC x 3 months        #COVID 19 Pneumonia  - symptoms started 7/22.   Not hypoxic. Supportive care  - cough suppressant for now  - monitor spo2   - added  Steroids- pt refused remdesivir     #DM -2 uncontrolled  - pt reports he is diet controlled   -  A1c at 10.9   - start metformin at [place] , need f/w   - use SSI for now     HTN- uncontrolled. Not on meds  - start on norvasc   -  Need f/w      #Mild transaminitis  - could be related to COVID 19, recommended recheck in 2 weeks with PCP     #CAD per CT chest  - remote hx of smoking  - need testing as outpt     DVT Prophylaxis: eliquis   Diet: ADULT DIET;  Regular; 4 carb choices (60 gm/meal)  Code Status: Full Code      Juan Antonio Brown MD, 8/7/2021 10:49 AM

## 2021-08-07 NOTE — PLAN OF CARE
Problem: Airway Clearance - Ineffective  Goal: Achieve or maintain patent airway  Outcome: Met This Shift     Problem: Breathing Pattern - Ineffective  Goal: Ability to achieve and maintain a regular respiratory rate  Outcome: Met This Shift     Problem:  Body Temperature -  Risk of, Imbalanced  Goal: Ability to maintain a body temperature within defined limits  Outcome: Met This Shift  Goal: Will regain or maintain usual level of consciousness  Outcome: Met This Shift  Goal: Complications related to the disease process, condition or treatment will be avoided or minimized  Outcome: Met This Shift

## 2021-08-08 LAB
CULTURE, RESPIRATORY: NORMAL
GLUCOSE BLD-MCNC: 253 MG/DL (ref 70–99)
GLUCOSE BLD-MCNC: 272 MG/DL (ref 70–99)
GLUCOSE BLD-MCNC: 283 MG/DL (ref 70–99)
GLUCOSE BLD-MCNC: 349 MG/DL (ref 70–99)
GRAM STAIN RESULT: NORMAL
PERFORMED ON: ABNORMAL

## 2021-08-08 PROCEDURE — 6370000000 HC RX 637 (ALT 250 FOR IP): Performed by: INTERNAL MEDICINE

## 2021-08-08 PROCEDURE — 99233 SBSQ HOSP IP/OBS HIGH 50: CPT | Performed by: INTERNAL MEDICINE

## 2021-08-08 PROCEDURE — 99232 SBSQ HOSP IP/OBS MODERATE 35: CPT | Performed by: INTERNAL MEDICINE

## 2021-08-08 PROCEDURE — 2580000003 HC RX 258: Performed by: INTERNAL MEDICINE

## 2021-08-08 PROCEDURE — 2060000000 HC ICU INTERMEDIATE R&B

## 2021-08-08 PROCEDURE — 6360000002 HC RX W HCPCS: Performed by: INTERNAL MEDICINE

## 2021-08-08 RX ADMIN — METFORMIN HYDROCHLORIDE 500 MG: 500 TABLET ORAL at 18:45

## 2021-08-08 RX ADMIN — SODIUM CHLORIDE: 9 INJECTION, SOLUTION INTRAVENOUS at 14:54

## 2021-08-08 RX ADMIN — INSULIN LISPRO 2 UNITS: 100 INJECTION, SOLUTION INTRAVENOUS; SUBCUTANEOUS at 21:48

## 2021-08-08 RX ADMIN — Medication 2000 UNITS: at 08:24

## 2021-08-08 RX ADMIN — METFORMIN HYDROCHLORIDE 500 MG: 500 TABLET ORAL at 08:24

## 2021-08-08 RX ADMIN — APIXABAN 10 MG: 5 TABLET, FILM COATED ORAL at 21:47

## 2021-08-08 RX ADMIN — APIXABAN 10 MG: 5 TABLET, FILM COATED ORAL at 08:24

## 2021-08-08 RX ADMIN — AMLODIPINE BESYLATE 5 MG: 5 TABLET ORAL at 08:24

## 2021-08-08 RX ADMIN — DEXAMETHASONE SODIUM PHOSPHATE 6 MG: 10 INJECTION, SOLUTION INTRAMUSCULAR; INTRAVENOUS at 14:57

## 2021-08-08 RX ADMIN — CEFTRIAXONE SODIUM 1000 MG: 1 INJECTION, POWDER, FOR SOLUTION INTRAMUSCULAR; INTRAVENOUS at 14:56

## 2021-08-08 RX ADMIN — AZITHROMYCIN 500 MG: 250 TABLET, FILM COATED ORAL at 08:24

## 2021-08-08 ASSESSMENT — PAIN SCALES - GENERAL: PAINLEVEL_OUTOF10: 0

## 2021-08-08 NOTE — PROGRESS NOTES
Assessment completed, see flowsheets. Night meds given. FSBS 352, 3 units ss humalog given per mar. Pt with no reports of pain at this time. Bed in lowest position with call light in reach.     Taurus Alvarez RN

## 2021-08-08 NOTE — PROGRESS NOTES
Patient resting comfortably and reports improving cough and breathing. Reports satisfaction with care,  content and has no needs at this time.

## 2021-08-08 NOTE — PROGRESS NOTES
IM Progress Note    Admit Date:  8/5/2021  2    Interval history:  covid 19 infection , PE   Remains on RA  No fevers  Refused Remdesivir   Unable to be dcd as no insurance and needs several meds    Subjective:  Mr. Darrian Lam seen up in bed, feels ok today   No further blood insputum    Cough improved and wishes to go home today      Objective:   BP (!) 145/84   Pulse 89   Temp 98 °F (36.7 °C) (Oral)   Resp 16   Ht 5' 10\" (1.778 m)   Wt 235 lb (106.6 kg)   SpO2 92%   BMI 33.72 kg/m²       Intake/Output Summary (Last 24 hours) at 8/8/2021 0714  Last data filed at 8/8/2021 0336  Gross per 24 hour   Intake 720 ml   Output --   Net 720 ml       Physical Exam:        General:  Middle aged male Awake, alert and oriented. Appears to be not in any distress  Mucous Membranes:  Pink , anicteric  Neck: No JVD, no carotid bruit, no thyromegaly  Chest:  Clear to auscultation bilaterally,  Improving basilar crackles  Cardiovascular:  RRR S1S2 heard, no murmurs or gallops  Abdomen:  Soft, undistended, non tender, no organomegaly, BS present  Extremities: No edema or cyanosis.  Distal pulses well felt  Neurological : grossly normal      Medications:   Scheduled Medications:    amLODIPine  5 mg Oral Daily    sodium chloride flush  5-40 mL Intravenous 2 times per day    insulin lispro  0-6 Units Subcutaneous TID WC    insulin lispro  0-3 Units Subcutaneous Nightly    Vitamin D  2,000 Units Oral Daily    apixaban  10 mg Oral BID    dexamethasone  6 mg Intravenous Q24H    cefTRIAXone (ROCEPHIN) IV  1,000 mg Intravenous Q24H    azithromycin  500 mg Oral Daily    remdesivir IVPB  200 mg Intravenous Once    Followed by   [facility] remdesivir IVPB  100 mg Intravenous Q24H     I   dextrose      sodium chloride      sodium chloride Stopped (08/07/21 1107)    sodium chloride       ondansetron, albuterol sulfate HFA, glucose, dextrose, glucagon (rDNA), dextrose, sodium chloride flush, sodium chloride, polyethylene glycol, acetaminophen **OR** acetaminophen, guaiFENesin-dextromethorphan, benzonatate, sodium chloride, albuterol-ipratropium    Lab Data:  Recent Labs     08/06/21  0030 08/07/21  0505   WBC 13.9* 10.4   HGB 14.3 13.7   HCT 41.9 39.8*   MCV 85.4 85.7    283     Recent Labs     08/06/21  0030 08/07/21  0505    137   K 3.8 4.3    106   CO2 22 18*   BUN 16 16   CREATININE 0.7* 0.6*     Recent Labs     08/06/21  0030   TROPONINI <0.01       Coagulation:   Lab Results   Component Value Date    APTT 31.1 08/06/2021     Cardiac markers:   Lab Results   Component Value Date    TROPONINI <0.01 08/06/2021         Lab Results   Component Value Date    ALT 47 (H) 08/07/2021    AST 30 08/07/2021    ALKPHOS 73 08/07/2021    BILITOT 0.6 08/07/2021           CULTURES  COVID 19, PCR: detected      EKG:  I have reviewed the EKG with the following interpretation:   Not done     RADIOLOGY     CT CHEST PULMONARY EMBOLISM W CONTRAST   Final Result   1. Right lower lobe pulmonary emboli. 2.  Commonly reported imaging features of COVID-19 pneumonia are present. Other processes such as influenza pneumonia and organizing pneumonia, as can   be seen with drug toxicity and connective tissue disease, can cause a similar   imaging pattern. PneTyp   3. Coronary artery disease. Critical results were called by Dr. Irene Pennington MD to Dr. Linda Justice on 8/6/2021   at 03:29.           XR CHEST PORTABLE   Final Result   Commonly reported imaging features of COVID-19 pneumonia are present. Other   processes such as influenza pneumonia and organizing pneumonia, as can be   seen with drug toxicity and connective tissue disease, can cause a similar   imaging pattern.  PneTyp              ASSESSMENT/PLAN:     #Acute right lower lobe pulmonary emboli  - suspect 2/2 COVID 19  - started on heparin gtt and transitioned to Eliquis   - no hypoxia  - mild hemoptysis not any worse with eliquis - which is now resolved  - need AC x 3 months        #COVID 19 Pneumonia  - symptoms started 7/22. Not hypoxic. Supportive care  - cough suppressant for now  - monitor spo2 - stable on RA  - added  Steroids- pt refused remdesivir  - improved and remains stable   - dc home with decadron      #DM -2 uncontrolled  - pt reports he is diet controlled   -  A1c at 10.9   - started metformin- given script  at dc , need f/w   - use SSI for now     HTN- uncontrolled. Not on meds  - started on norvasc   -  Need f/w      #Mild transaminitis  - could be related to COVID 19, recommended recheck in 2 weeks with PCP     #CAD per CT chest  - remote hx of smoking  - need testing as outpt     DVT Prophylaxis: eliquis   Diet: ADULT DIET;  Regular; 4 carb choices (60 gm/meal)  Code Status: Full Code    Dc planning as pt reports his cousin can pay for meds    Kathryn Mcintyre MD, 8/8/2021 7:43 AM

## 2021-08-08 NOTE — PROGRESS NOTES
Pulmonary Progress Note    CC: COVID-19    Subjective:   Room air  Dry cough-somewhat better        Intake/Output Summary (Last 24 hours) at 8/8/2021 0755  Last data filed at 8/8/2021 0754  Gross per 24 hour   Intake 1200 ml   Output --   Net 1200 ml       Exam:   BP (!) 145/84   Pulse 89   Temp 98 °F (36.7 °C) (Oral)   Resp 16   Ht 5' 10\" (1.778 m)   Wt 235 lb (106.6 kg)   SpO2 92%   BMI 33.72 kg/m²  on room air  Gen:  Mild distress. Ill-appearing  Eyes: PERRL. No sclera icterus. No conjunctival injection. ENT: No discharge. Pharynx clear. Neck: Trachea midline. No obvious mass. Resp:  Mild accessory muscle use. Bilateral crackles. No wheezes. No rhonchi. No dullness on percussion. CV: Regular rate. Regular rhythm. No murmur or rub. No edema. GI: Non-tender. Non-distended. No hernia. Skin: Warm and dry. No nodule on exposed extremities. Lymph: No cervical LAD. No supraclavicular LAD. M/S: No cyanosis. No joint deformity. No clubbing. Neuro: Awake. Alert. Moves all four extremities. Psych: Oriented x 3.  No anxiety.        Scheduled Meds:   metFORMIN  500 mg Oral BID WC    amLODIPine  5 mg Oral Daily    sodium chloride flush  5-40 mL Intravenous 2 times per day    insulin lispro  0-6 Units Subcutaneous TID WC    insulin lispro  0-3 Units Subcutaneous Nightly    Vitamin D  2,000 Units Oral Daily    apixaban  10 mg Oral BID    dexamethasone  6 mg Intravenous Q24H    cefTRIAXone (ROCEPHIN) IV  1,000 mg Intravenous Q24H    azithromycin  500 mg Oral Daily    remdesivir IVPB  200 mg Intravenous Once    Followed by   Griffin remdesivir IVPB  100 mg Intravenous Q24H     Continuous Infusions:   dextrose      sodium chloride      sodium chloride Stopped (08/07/21 1107)    sodium chloride       PRN Meds:  ondansetron, albuterol sulfate HFA, glucose, dextrose, glucagon (rDNA), dextrose, sodium chloride flush, sodium chloride, polyethylene glycol, acetaminophen **OR** acetaminophen, guaiFENesin-dextromethorphan, benzonatate, sodium chloride, albuterol-ipratropium    Labs:  CBC:   Recent Labs     08/06/21  0030 08/07/21  0505   WBC 13.9* 10.4   HGB 14.3 13.7   HCT 41.9 39.8*   MCV 85.4 85.7    283     BMP:   Recent Labs     08/06/21  0030 08/07/21  0505    137   K 3.8 4.3    106   CO2 22 18*   BUN 16 16   CREATININE 0.7* 0.6*     LIVER PROFILE:   Recent Labs     08/06/21  0030 08/07/21  0505   AST 41* 30   ALT 50* 47*   BILITOT 0.7 0.6   ALKPHOS 102 73     PT/INR: No results for input(s): PROTIME, INR in the last 72 hours. APTT:   Recent Labs     08/06/21  0030   APTT 31.1     UA:No results for input(s): NITRITE, COLORU, PHUR, LABCAST, WBCUA, RBCUA, MUCUS, TRICHOMONAS, YEAST, BACTERIA, CLARITYU, SPECGRAV, LEUKOCYTESUR, UROBILINOGEN, BILIRUBINUR, BLOODU, GLUCOSEU, AMORPHOUS in the last 72 hours. Invalid input(s): KETONESU  No results for input(s): PHART, TBP1PAE, PO2ART in the last 72 hours. Cultures:   8/5 COVID-19 detected  8/6 respiratory NGTD  8/6 Legionella not detected    Films:  CTPA 8/6 imaging was reviewed by me and showed   1. Right lower lobe pulmonary emboli. 2. Commonly reported imaging features of COVID-19 pneumonia are present.    3. Coronary artery disease.      ASSESSMENT:  · Acute hypoxemic respiratory failure  · COVID-19 viral pneumonia  · Abnormal CT chest with diffuse bilateral GGO-cannot exclude superimposed bacterial pneumonia  · RLE PE     PLAN:  · Supplemental oxygen to maintain SaO2 >92%; wean as tolerated  · Continuous pulse oximetry  · Droplet plus isolation (surgical mask, eye protection, gown, glove)  · Ceftriaxone and Zithromax day # 3  · Follow-up cultures  · Dexamethasone 6 mg IV day # 3  · Declined CCP and remdesivir   · Eliquis

## 2021-08-08 NOTE — PROGRESS NOTES
Shift assessment complete. See flow sheet. Scheduled meds given. See MAR. Patients head-toe complete, VS are logged, active bowel sound noted in all four quadrants. No needs are noted at this time. Call light and bedside table are within reach. The bed is locked and is in the lowest position.

## 2021-08-09 VITALS
HEIGHT: 74 IN | TEMPERATURE: 97.2 F | RESPIRATION RATE: 17 BRPM | HEART RATE: 81 BPM | OXYGEN SATURATION: 95 % | WEIGHT: 239.1 LBS | BODY MASS INDEX: 30.68 KG/M2 | SYSTOLIC BLOOD PRESSURE: 141 MMHG | DIASTOLIC BLOOD PRESSURE: 68 MMHG

## 2021-08-09 LAB
GLUCOSE BLD-MCNC: 256 MG/DL (ref 70–99)
GLUCOSE BLD-MCNC: 260 MG/DL (ref 70–99)
PERFORMED ON: ABNORMAL
PERFORMED ON: ABNORMAL

## 2021-08-09 PROCEDURE — 6370000000 HC RX 637 (ALT 250 FOR IP): Performed by: INTERNAL MEDICINE

## 2021-08-09 PROCEDURE — 2580000003 HC RX 258: Performed by: INTERNAL MEDICINE

## 2021-08-09 PROCEDURE — 99232 SBSQ HOSP IP/OBS MODERATE 35: CPT | Performed by: INTERNAL MEDICINE

## 2021-08-09 PROCEDURE — 99239 HOSP IP/OBS DSCHRG MGMT >30: CPT | Performed by: INTERNAL MEDICINE

## 2021-08-09 RX ORDER — PEN NEEDLE, DIABETIC 31 GX5/16"
1 NEEDLE, DISPOSABLE MISCELLANEOUS DAILY
Qty: 30 EACH | Refills: 0 | Status: SHIPPED | OUTPATIENT
Start: 2021-08-09 | End: 2021-09-11

## 2021-08-09 RX ORDER — INSULIN LISPRO 100 [IU]/ML
5-10 INJECTION, SOLUTION INTRAVENOUS; SUBCUTANEOUS
Qty: 1 PEN | Refills: 0 | Status: SHIPPED | OUTPATIENT
Start: 2021-08-09 | End: 2021-09-11

## 2021-08-09 RX ADMIN — METFORMIN HYDROCHLORIDE 500 MG: 500 TABLET ORAL at 11:17

## 2021-08-09 RX ADMIN — Medication 10 ML: at 11:18

## 2021-08-09 RX ADMIN — AMLODIPINE BESYLATE 5 MG: 5 TABLET ORAL at 11:17

## 2021-08-09 RX ADMIN — AZITHROMYCIN 500 MG: 250 TABLET, FILM COATED ORAL at 11:18

## 2021-08-09 RX ADMIN — Medication 2000 UNITS: at 11:17

## 2021-08-09 RX ADMIN — APIXABAN 10 MG: 5 TABLET, FILM COATED ORAL at 11:18

## 2021-08-09 ASSESSMENT — PAIN SCALES - GENERAL
PAINLEVEL_OUTOF10: 0
PAINLEVEL_OUTOF10: 0

## 2021-08-09 NOTE — CARE COORDINATION
Spoke with Dina Zavala from [facility] and she will assess pt for assistance with prescriptions for dc.

## 2021-08-09 NOTE — PROGRESS NOTES
Pulmonary Progress Note    CC: COVID-19, PE    Subjective: Pt feels somewhat better. Intake/Output Summary (Last 24 hours) at 8/9/2021 0740  Last data filed at 8/9/2021 0404  Gross per 24 hour   Intake 3403.87 ml   Output 600 ml   Net 2803.87 ml       Exam:   BP (!) 147/72   Pulse 70   Temp 97 °F (36.1 °C) (Oral)   Resp 17   Ht 6' 2\" (1.88 m)   Wt 239 lb 1.6 oz (108.5 kg)   SpO2 94%   BMI 30.70 kg/m²  on room air  Gen:  Mild distress. Ill-appearing  Eyes: PERRL. No sclera icterus. No conjunctival injection. ENT: No discharge. Pharynx clear. Neck: Trachea midline. No obvious mass. Resp:  Mild accessory muscle use. Few crackles. No wheezes. No rhonchi. No dullness on percussion. CV: Regular rate. Regular rhythm. No murmur or rub. No edema. GI: Non-tender. Non-distended. No hernia. Skin: Warm and dry. No nodule on exposed extremities. M/S: No cyanosis. No joint deformity. No clubbing. Neuro: Awake. Alert.  Moves all four extremities.          Scheduled Meds:   metFORMIN  500 mg Oral BID WC    amLODIPine  5 mg Oral Daily    sodium chloride flush  5-40 mL Intravenous 2 times per day    insulin lispro  0-6 Units Subcutaneous TID WC    insulin lispro  0-3 Units Subcutaneous Nightly    Vitamin D  2,000 Units Oral Daily    apixaban  10 mg Oral BID    dexamethasone  6 mg Intravenous Q24H    cefTRIAXone (ROCEPHIN) IV  1,000 mg Intravenous Q24H    azithromycin  500 mg Oral Daily     Continuous Infusions:   dextrose      sodium chloride      sodium chloride 75 mL/hr at 08/09/21 0353    sodium chloride       PRN Meds:  ondansetron, albuterol sulfate HFA, glucose, dextrose, glucagon (rDNA), dextrose, sodium chloride flush, sodium chloride, polyethylene glycol, acetaminophen **OR** acetaminophen, guaiFENesin-dextromethorphan, benzonatate, sodium chloride, albuterol-ipratropium    Labs:  CBC:   Recent Labs     08/07/21  0505   WBC 10.4   HGB 13.7   HCT 39.8*   MCV 85.7    BMP:   Recent Labs     08/07/21  0505      K 4.3      CO2 18*   BUN 16   CREATININE 0.6*     LIVER PROFILE:   Recent Labs     08/07/21  0505   AST 30   ALT 47*   BILITOT 0.6   ALKPHOS 73     PT/INR: No results for input(s): PROTIME, INR in the last 72 hours. APTT:   No results for input(s): APTT in the last 72 hours. UA:No results for input(s): NITRITE, COLORU, PHUR, LABCAST, WBCUA, RBCUA, MUCUS, TRICHOMONAS, YEAST, BACTERIA, CLARITYU, SPECGRAV, LEUKOCYTESUR, UROBILINOGEN, BILIRUBINUR, BLOODU, GLUCOSEU, AMORPHOUS in the last 72 hours. Invalid input(s): KETONESU  No results for input(s): PHART, DEX2PFQ, PO2ART in the last 72 hours. Cultures:   8/5 COVID-19 detected  8/6 respiratory NGTD  8/6 Legionella not detected    Films:  CTPA 8/6 showed   1. Right lower lobe pulmonary emboli. 2. Commonly reported imaging features of COVID-19 pneumonia are present.    3. Coronary artery disease.      ASSESSMENT:  · Acute hypoxemic respiratory failure  · COVID-19 viral pneumonia  · Abnormal CT chest with diffuse bilateral GGO-cannot exclude superimposed bacterial pneumonia  · RLL PE     PLAN:  · Supplemental oxygen to maintain SaO2 >92%; wean as tolerated  · Continuous pulse oximetry  · Droplet plus isolation (surgical mask, eye protection, gown, glove)  · Ceftriaxone and Zithromax day # 4  · Dexamethasone 6 mg IV day # 4  · Declined CCP and remdesivir   · Eliquis

## 2021-08-09 NOTE — CARE COORDINATION
DISCHARGE ORDER  Date/Time 2021 12:53 PM  Completed by: Amina Wahl RN, Case Management    Patient Name: Marcia Sagastume      : 1970  Admitting Diagnosis: Acute pulmonary embolism without acute cor pulmonale, unspecified pulmonary embolism type ([facility] [address] [I26.99]  Pulmonary embolism associated with COVID-19 ([facility] [address] [U07.1, I26.99]  COVID-19 [U07.1]      Admit order Date and Status:2021  (verify MD's last order for status of admission)      Noted discharge order. If applicable PT/OT recommendation at Discharge: NA  DME recommendation by PT/OT:NA  Confirmed discharge plan: Yes  with whom_pt______________  If pt confirmed DC plan does family need to be contacted by CM No if yes who______  Discharge Plan: Chart reviewed. Spoke with pt via telephone due to C-19 status. Pt is returning home with family. Pt is jayde [facility]. Pt agreeable to [facility] for discharge prescriptions. Bedside RN Porter Garcia to follow up with [facility] also will ambulate pt and supply pt with home pulse ox if needed. No dc needs voiced. Reviewed chart. Role of discharge planner explained and patient verbalized understanding. Discharge order is noted. Has Home O2 in place on admit:  No    Pt is being d/c'd to home today. Pt's O2 sats are 94% on RA. Discharge timeout done with Porter Garcia. All discharge needs and concerns addressed.

## 2021-08-09 NOTE — PROGRESS NOTES
Patient is resting showing no s/s of distress. Patient is alert and oriented. Meds were given, see MAR. Water and ice cup refilled. Bed is in lowest position and call light is within reach. Will continue to monitor. Shift assessment complete, see flowsheets.  Antoine Zheng RN

## 2021-08-09 NOTE — PROGRESS NOTES
Eliquis Monitoring     Dx: PE  Dose: 10 mg BID x7 days then 5 mg BID thereafter     Plan  Continue the 10 mg BID until 8/13/21 then transition to 5 mg BID. Currently no order for the 5 mg tablets. Pharmacy will continue to monitor.      Tonny MccartneyD, [facility], 8/9/2021 8:56 AM

## 2021-08-09 NOTE — PROGRESS NOTES
Patient reviewed DC instructions with a verbal understanding. Patient IV removed with no complications. Patient dressed and stated he has all of his belongings. Patient stated Dr. Kan Smith had stated he can go back to work on the first of September. Patient seen by Dr. Cinthia Archibald who wanted to work excuse from July 26th for missed work due to being sick up until the 16th of this month. Patient stated he will call back to try and get a hold of Dr. Kan Smith or his PCP for extension of work excuse.

## 2021-08-09 NOTE — FLOWSHEET NOTE
08/08/21 0824 08/08/21 1522 08/08/21 2130   Vital Signs   BP (!) 172/82 139/85 (!) 174/93      08/08/21 2245 08/09/21 0345   Vital Signs   BP (!) 161/92 (!) 147/72

## 2021-08-09 NOTE — PROGRESS NOTES
Pt has had a productive cough throughout the night but pt has denied needing any PRN medications.  Yasmany Mcrae RN

## 2021-08-10 ENCOUNTER — CARE COORDINATION (OUTPATIENT)
Dept: CASE MANAGEMENT | Age: 51
End: 2021-08-10

## 2021-08-10 ENCOUNTER — TELEPHONE (OUTPATIENT)
Dept: INTERNAL MEDICINE CLINIC | Age: 51
End: 2021-08-10

## 2021-08-10 LAB
BLOOD CULTURE, ROUTINE: NORMAL
CULTURE, BLOOD 2: NORMAL

## 2021-08-10 NOTE — CARE COORDINATION
Patient contacted regarding COVID-19 diagnosis. Discussed COVID-19 related testing which was available at this time. COVID-19 Detected on 2021. Patient informed of results, if available? Yes    Call within 2 business days of discharge: Yes  Discharge Date: 21   RARS: Readmission Risk Score: 8      Care Transition Nurse contacted the patient by telephone to perform post discharge assessment. Verified name and  with patient as identifiers. Provided introduction to self, and explanation of the CTN role, and reason for call due to risk factors for infection and/or exposure to COVID-19. Symptoms reviewed with patient who verbalized the following symptoms: cough, no new symptoms and no worsening symptoms. Due to no new or worsening symptoms encounter was not routed to provider for escalation. Discussed follow-up appointments. If no appointment was previously scheduled, appointment scheduling offered: Yes  [facility] follow up appointment(s):   Future Appointments   Date Time Provider Franco Lu   2021  9:30 AM Donal Han MD [facility]     Non-[facility] follow up appointment(s): NA - has no PCP - agreeable to care clinic referral    Non-face-to-face services provided:  Scheduled appointment with PCP-new patient appt at care clinic as below  Obtained and reviewed discharge summary and/or continuity of care documents  Education of patient/family/caregiver/guardian to support self-management-self-isolation, BS management  Assessment and support for treatment adherence and medication management-1111F completed  Assistance in accessing community resources-Care Clinic referral    Advance Care Planning:   Does patient have an Advance Directive:  decision maker updated and emailed him the [facility] ACP documents for review. He was encouraged to contact CTN if interested in referral to MSW to complete the documents.  .     Educated patient about risk for severe COVID-19 due to risk factors according to [facility] guidelines. CTN reviewed discharge instructions, medical action plan and red flag symptoms patient who verbalized understanding. Discussed COVID vaccination status Yes. Education provided on COVID-19 vaccination as appropriate. Discussed exposure protocols and quarantine with CDC Guidelines. Patient was given an opportunity to verbalize any questions and concerns and agrees to contact the CTN or health care provider for questions related to their healthcare. Reviewed and educated patient on any new and changed medications related to discharge diagnosis. Was patient discharged with a pulse oximeter? No     1st attempt - unable to reach or leave message 2/2 voice mailbox not set up. Petr Becker returned call. Confirms he has medications. All of his medications are new to him. He was educated on diabetic diet, not skipping meals and checking BS prior to eating and giving insulin once he has eaten half of his meal. He checked his BS last night it was 310 after dinner and he took a metformin. His insulin dosing does not specify when to take 5 units vs 10 units. His instructions for checking BS state daily fasting and 2 hour post prandial. CTN will send Epic inbox to Dr Donna Zapata to clarify. He is taking Eliquis new to him and educated on risks of bleeding, s/s to look for, be careful shaving with razor, use soft toothbrush, etc.     He reports he feels improved. Still with productive cough. Does not have BP cuff or pulse oximeter. He asks for a work release for longer than the one week he was given. He does not have a PCP. He is agreeable to a referral to the care clinic at [address] [place] [address] and the call was connected to Rosmery Marmolejo who scheduled him for follow up as noted (8/18/2021 at 0930). CTN will get back with him about insulin dosing with meals and BS checks. He is in agreement with plan. CTN provided contact information for future needs. Epic inbox to Dr Donna Zapata to clarify insulin dosing. Emailed patient per his request the [place] ACP documents and included contact information for the [facility] for his records. Plan for follow-up call in 1-2 days based on severity of symptoms and risk factors.     Gilma Yanez RN  Care Transitions Nurse  [phone] mobile

## 2021-08-10 NOTE — TELEPHONE ENCOUNTER
----- Message from Shana Hawkins MD sent at 8/10/2021  1:54 PM EDT -----  Contact: Osorio Mckay [phone]  I did talk to her  ----- Message -----  From: Willian Morrow  Sent: 8/10/2021   1:24 PM EDT  To: Shana Hawkins MD    Brittney Griffin, the caller, is patient's medical POA and cousin. Patient was a hospital patient only. He has covid pneumonia and pulmonary embolism. He was dc'd on 8/9/21. He is unsure of his insulin dosage and needs further explanation, as this is all new for him. Brittney Griffin also stated he received a note he could return to work, at [employer] on 8/16/21 by discharge physician. She states it was discussed with you for him to return to work on 9-1-21. Can this be changed? His employer, [employer] are in agreement with the 9-1-21 return date and just needs it in writing. They have agreed to pay his full wages until then.      Thank you

## 2021-08-11 ENCOUNTER — CARE COORDINATION (OUTPATIENT)
Dept: CASE MANAGEMENT | Age: 51
End: 2021-08-11

## 2021-08-11 NOTE — CARE COORDINATION
[address] Transitions Follow Up Call    2021    Patient: Oscar Dexter  Patient : 1970   MRN: [id number]  Reason for Admission: acute RLE PE 2/2 COVID-19 (new Eliquis x 3 months), COVID-19 PNA, DM2 uncontrolled (A1C 10.9%), HTN uncontrolled, mild transaminitis, CAD per chest CT  Discharge Date: 21 RARS: Readmission Risk Score: 8       COVID-19 Detected on 2021. Spoke with: Oscar Dexter (patient)    Patient states his BS this morning \"400 something\" and he gave himself 5 units humalog. States his glucometer gave reading of \"HIGH\" yesterday. He has been randomly giving himself 5 units of Humalog after the elevated readings. He was 270 something middle of night. He thought his care clinic appt was this morning but it wasn't. Patient declines referral to care transition team dietician for additional education on diabetic diet. States he is cutting carbohydrates and he is able to name the foods high in carbs that he has cut down or eliminating. CTN will outreach by phone Dr Vikash Mcdaniel office again for insulin dosing. Outreach to [employer]). She is aware of patient/situation and will send message to Dr Vikash Mcdaniel requesting advice on insulin dosing. UPDATE: received return call from Joseluis Weir at Dr Rodrick Griffin office. He is gone for the day. She will watch for clarification of insulin dosing tomorrow and update CTN. States patient's cousin Gt Dawson was concerned that Marval Jose may not be taking medications as prescribed. CTN outreach again to patient to reinforce medication instructions. Unable to reach at this time and voice mailbox not set up. CTN will try again at another time.        Follow Up  Future Appointments   Date Time Provider Franco Lu   2021  9:30 AM MD SUKUMAR Parrish Chi, RN

## 2021-08-12 ENCOUNTER — CARE COORDINATION (OUTPATIENT)
Dept: CASE MANAGEMENT | Age: 51
End: 2021-08-12

## 2021-08-17 ENCOUNTER — CARE COORDINATION (OUTPATIENT)
Dept: CASE MANAGEMENT | Age: 51
End: 2021-08-17

## 2021-08-18 ENCOUNTER — OFFICE VISIT (OUTPATIENT)
Dept: INTERNAL MEDICINE CLINIC | Age: 51
End: 2021-08-18

## 2021-08-18 VITALS
HEIGHT: 74 IN | WEIGHT: 225 LBS | SYSTOLIC BLOOD PRESSURE: 126 MMHG | DIASTOLIC BLOOD PRESSURE: 84 MMHG | TEMPERATURE: 97 F | HEART RATE: 100 BPM | OXYGEN SATURATION: 97 % | BODY MASS INDEX: 28.88 KG/M2

## 2021-08-18 DIAGNOSIS — F41.8 ANXIOUS DEPRESSION: ICD-10-CM

## 2021-08-18 DIAGNOSIS — I26.93 SINGLE SUBSEGMENTAL PULMONARY EMBOLISM WITHOUT ACUTE COR PULMONALE (HCC): ICD-10-CM

## 2021-08-18 DIAGNOSIS — U07.1 COVID-19: Primary | ICD-10-CM

## 2021-08-18 DIAGNOSIS — R06.02 SHORTNESS OF BREATH: ICD-10-CM

## 2021-08-18 DIAGNOSIS — E11.9 TYPE 2 DIABETES MELLITUS WITHOUT COMPLICATION, WITHOUT LONG-TERM CURRENT USE OF INSULIN (HCC): ICD-10-CM

## 2021-08-18 LAB
EXPIRATORY TIME: NORMAL
FEF 25-75% %PRED-PRE: NORMAL
FEF 25-75% PRED: NORMAL
FEF 25-75%-PRE: NORMAL
FEV1 %PRED-PRE: 0.53 %
FEV1 PRED: NORMAL
FEV1/FVC %PRED-PRE: NORMAL
FEV1/FVC PRED: NORMAL
FEV1/FVC: 0.83 %
FEV1: NORMAL
FVC %PRED-PRE: NORMAL
FVC PRED: NORMAL
FVC: NORMAL
PEF %PRED-PRE: NORMAL
PEF PRED: NORMAL
PEF-PRE: NORMAL

## 2021-08-18 PROCEDURE — 1111F DSCHRG MED/CURRENT MED MERGE: CPT | Performed by: INTERNAL MEDICINE

## 2021-08-18 PROCEDURE — 99204 OFFICE O/P NEW MOD 45 MIN: CPT | Performed by: INTERNAL MEDICINE

## 2021-08-18 RX ORDER — CITALOPRAM 20 MG/1
20 TABLET ORAL DAILY
Qty: 30 TABLET | Refills: 5 | Status: SHIPPED | OUTPATIENT
Start: 2021-08-18 | End: 2021-09-10 | Stop reason: SDUPTHER

## 2021-08-18 ASSESSMENT — PULMONARY FUNCTION TESTS
FEV1/FVC: 0.83
FEV1_PERCENT_PREDICTED_PRE: 0.53

## 2021-08-18 NOTE — LETTER
[address] [address]  Phone: [phone]  Fax: [phone]    Magdi Turner MD           August 18, 2021     Patient: Justen Patel   YOB: 1970   Date of Visit: 8/18/2021       To Whom It May Concern: It is my medical opinion that Mary Muhammad can return to work in one week from this date as long as he is able to. He has another follow up visit with us in 3 weeks to determine his improvement. Oscar Egan was diagnosed in the Hospital with Covid-19 pneumonia and a pulmonary embolism (blood clot in the lung). If you have any questions or concerns, please don't hesitate to call.     Sincerely,        Magdi Turner MD

## 2021-08-18 NOTE — PATIENT INSTRUCTIONS
1. Type 2 Diabetes:  Hemoglobin A1c was 10.9 (Aug 6) with goal less than 7.0%. This suggest longer term elevated glucose levels. Instead of insulin, START taking metformin 500 mg tablet TWICE per day. Check morning (fasting) glucose level, write down and bring to clinic. 2. 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 We may consider supplemental buspirone in the future. 3. Pulmonary embolus (blood clot) in the right lower lung:  Continue taking apixaban (Eliquis) 5 mg tablet TWICE per day for at least six months (Feb 2022). Oxygen saturation is good today at 97% (goal above 94%). 4. Dyspnea (shortness of breath) with exertion: We will check baseline spirometry (pulmonary function test) today. 5. Cough related to bronchospasms: We may consider an albuterol inhaler if this interferes activity.         Follow-up in three weeks (review daily glucose level)

## 2021-08-18 NOTE — PROGRESS NOTES
SUBJECTIVE:  New patient. Hospitalized 8/5 - 8/9 with COVID-19 pneumon and subsequent RLL pulmonary embolus. Discharged on Eliquis starter pack (5 mg BID). Symptoms started 7/22. Remote smoker, quit at age 25 . No previous dvt or PE. Treated with convalescent plasma. Hypertension in the hospital and discharged on amlodipine and stopped after 3 days. Blood pressure good at home. History of type 2 diabetes (diagnosed 2009) treated with diet. Increased glucose levels on steroids in the hospital.  Discharged on sliding scale insulin. Anxiety symptoms have caused difficulty with sleeping. Chief Complaint   Patient presents with    Follow-Up from [address] [place] [address] follow up with pulmonary embolism    Diabetes     New to diabetes meds. Pt states the insulin is making him feel weird and does not want to be on it.  Anxiety     Pt is having bad anxiety symptoms, racing thoughts, palpitations    Other     eczema on lower left leg       MEDICATIONS:  insulin lispro, 1 Unit Dial, (HUMALOG KWIKPEN) 100 UNIT/ML SOPN Inject 5-10 Units into the skin 3 times daily (before meals)   apixaban starter pack (ELIQUIS) 5 MG TBPK tablet Take 1 tablet by mouth See Admin Instructions Take 10 mg twice daily x 5 more days ,then 5 mg twice daily x 3 months   metFORMIN (GLUCOPHAGE) 500 MG tablet Take 1 tablet by mouth 2 times daily (with meals)   glucose monitoring (FREESTYLE FREEDOM) kit 1 kit by Does not apply route daily   blood glucose monitor strips Test two times a day & as needed for symptoms of irregular blood glucose. Dispense sufficient amount for indicated testing frequency plus additional to accommodate PRN testing needs. amLODIPine (NORVASC) 5 MG tablet Take 1 tablet by mouth daily (not taking)   ibuprofen (IBU) 400 MG tablet Take 1 tablet by mouth every 6 hours as needed (not taking).        Lab Results   Component Value Date    LABA1C 10.9 08/06/2021     Lab Results   Component Value Date    WBC 10.4 08/07/2021 HGB 13.7 08/07/2021     08/07/2021    MCV 85.7 08/07/2021     Lab Results   Component Value Date     08/07/2021    K 4.3 08/07/2021     08/07/2021    CO2 18 (L) 08/07/2021    BUN 16 08/07/2021    CREATININE 0.6 (L) 08/07/2021    GLUCOSE 274 (H) 08/07/2021     D-dimer (8/6) > 5250 ng/mL    SARS-CoV-2 RT-PCR (8/5) detected    PFT (8/18) 2.34 (53%) / 2.82 (49%) = 0.83 c/s moderate restriction    OBJECTIVE:    /84   Pulse 100   Temp 97 °F (36.1 °C)   Ht 6' 2\" (1.88 m)   Wt 225 lb (102.1 kg)   SpO2 97%   BMI 28.89 kg/m²   HEENT:  Oropharynx clear, no lymphadenopathy,   LUNGS:  Diffuse rhonchi, reasonable overall air movement  HEART:  Regular rhythm, no appreciable murmur  ABD:  Benign, active bowel sounds  EXT:  No edema, pulses palpable  NEURO:  No focal neurologic deficits noted. ASSESSMENT / PLAN:   1. Type 2 Diabetes:  Hemoglobin A1c was 10.9 (Aug 6) with goal less than 7.0%. This suggest longer term elevated glucose levels. Instead of insulin, START taking metformin 500 mg tablet TWICE per day. Check morning (fasting) glucose level, write down and bring to clinic. 2. 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 We may consider supplemental buspirone in the future. 3. Pulmonary embolus (blood clot) in the right lower lung:  Continue taking apixaban (Eliquis) 5 mg tablet TWICE per day for at least six months (Feb 2022). Oxygen saturation is good today at 97% (goal above 94%). 4. Dyspnea (shortness of breath) with exertion:  Spirometry (pulmonary function test) today shows moderate restriction. May benefit from pulmonary rehab. 5. Cough related to bronchospasms: We may consider an albuterol inhaler if this interferes activity.         Follow-up in three weeks (review daily glucose level)

## 2021-08-24 ENCOUNTER — CARE COORDINATION (OUTPATIENT)
Dept: CASE MANAGEMENT | Age: 51
End: 2021-08-24

## 2021-08-24 NOTE — CARE COORDINATION
Patient contacted regarding COVID-19 diagnosis. COVID-19 Detected on 8/5/2021.        Care Transition Nurse contacted the patient by telephone to perform follow-up assessment. Symptoms reviewed with patient who verbalized the following symptoms: fatigue, no new symptoms and no worsening symptoms. Due to no new or worsening symptoms encounter was not routed to provider for escalation. States he has been able to increase his level of activity with walking. His BS now running 260-350. Taking metformin now. Educated him on this medication. Instructed him if numbers are not trending down he can contact Dr Kendra Pereira to see if he has recommendations prior ot scheduled appt. He voices understanding and confirms he has care clinic number. He is new on Celexa also and encouraged to take daily as ordered as can take longer for full effect. He voices understanding. He plans to return to work soon light duty. Denies needs today. CTN provided contact information for future reference. Plan for follow-up call in 5-7 days based on severity of symptoms and risk factors.     Aylsa Kate RN  Care Transition Nurse  [phone] mobile    Future Appointments   Date Time Provider Franco Lu   9/10/2021 10:15 AM Glenys Gabriel MD [facility]

## 2021-09-03 ENCOUNTER — CARE COORDINATION (OUTPATIENT)
Dept: CASE MANAGEMENT | Age: 51
End: 2021-09-03

## 2021-09-03 NOTE — CARE COORDINATION
Patient contacted regarding COVID diagnosis. COVID-19 Detected on 8/5/2021.        Care Transition Nurse contacted the patient by telephone to perform follow-up assessment. Symptoms reviewed with patient who verbalized the following symptoms: fatigue, no new symptoms, no worsening symptoms and sore lung. Due to no new or worsening symptoms encounter was not routed to provider for escalation. States his L lung is still really sore. States he was only able work one day before being sent home. States he choked on cereal, coughed up the piece and continued coughing for 30 min. Denies hemotpysis, fever, chills. States he just has \"a sore spot in (his) left lung\". Calling his HR dept to see about taking additional leave. Fears losing his job. States he is almost out of Eliquis and cannot afford to refill. States he has 5 days left. Educated him that the risk of stopping without alternative option to replace he is at risk for life-threatening event (PE, DVT, CVA). He states he was educated that he should continue this medication for 3-6 months. States [facility] has not gotten back to him about his health insurance. Provided him with information on financial counselor who started the insurance process with him during admission. He is agreeable to referral to care transition team SW as well. CTN will also notify Ovidio Davila at [facility] office of this so provider is aware. He has appt next week as below. This appt is after his Eliquis supply at home runs out. He voices understanding this med should not be stopped yet. We discussed other anticoagulants that require additional labwork, testing, compliance (warfarin) and he states that it would be a hardship to have frequent lab draws for INR with transportation issues. He voices understanding of continuing Eliquis. CTN provided him with  number to contact [phone] ([phone]).  He will call immediately to apply for PAP and/or see if he is eligible for co-pay card. Referral by chart routing at close of note to [address] [place] [address].     Marti Quintana, RN  Care Transition Nurse  [phone] mobile    Future Appointments   Date Time Provider Franco Lu   9/10/2021 10:15 AM Vinny Dawson MD [facility]

## 2021-09-08 ENCOUNTER — CARE COORDINATION (OUTPATIENT)
Dept: CASE MANAGEMENT | Age: 51
End: 2021-09-08

## 2021-09-08 NOTE — CARE COORDINATION
Patient resolved from the [address] [facility] episode on 9/8/2021. Unable to reach or leave message because voice mailbox not set up. Episode resolved at this time. Care clinic notified of patient's plan to call for PAP on Eliquis last week as noted in last CTN outreach.      Emilie Kimball RN  Care Transition Nurse  [phone] mobile    Future Appointments   Date Time Provider Franco Dupreeisti   9/10/2021 10:15 AM Jabier Jones MD  [facility]

## 2021-09-10 ENCOUNTER — OFFICE VISIT (OUTPATIENT)
Dept: INTERNAL MEDICINE CLINIC | Age: 51
End: 2021-09-10

## 2021-09-10 VITALS
HEIGHT: 74 IN | BODY MASS INDEX: 30.03 KG/M2 | TEMPERATURE: 98 F | SYSTOLIC BLOOD PRESSURE: 145 MMHG | WEIGHT: 234 LBS | DIASTOLIC BLOOD PRESSURE: 89 MMHG | OXYGEN SATURATION: 97 % | HEART RATE: 93 BPM

## 2021-09-10 DIAGNOSIS — I26.93 SINGLE SUBSEGMENTAL PULMONARY EMBOLISM WITHOUT ACUTE COR PULMONALE (HCC): ICD-10-CM

## 2021-09-10 DIAGNOSIS — U07.1 COVID-19: Primary | ICD-10-CM

## 2021-09-10 DIAGNOSIS — F41.8 ANXIOUS DEPRESSION: ICD-10-CM

## 2021-09-10 DIAGNOSIS — E11.9 TYPE 2 DIABETES MELLITUS WITHOUT COMPLICATION, WITHOUT LONG-TERM CURRENT USE OF INSULIN (HCC): ICD-10-CM

## 2021-09-10 PROCEDURE — 99214 OFFICE O/P EST MOD 30 MIN: CPT | Performed by: INTERNAL MEDICINE

## 2021-09-10 RX ORDER — CITALOPRAM 20 MG/1
20 TABLET ORAL DAILY
Qty: 30 TABLET | Refills: 5 | Status: SHIPPED | OUTPATIENT
Start: 2021-09-10

## 2021-09-10 RX ORDER — GUAIFENESIN 600 MG/1
600 TABLET, EXTENDED RELEASE ORAL 2 TIMES DAILY
Qty: 60 TABLET | Refills: 1 | Status: SHIPPED | OUTPATIENT
Start: 2021-09-10

## 2021-09-10 NOTE — LETTER
[address] [address]  Phone: [phone]  Fax: [phone]    Lachelle Albarran MD        September 10, 2021     Patient: Tere Russell   YOB: 1970   Date of Visit: 9/10/2021             To Whom It May Concern: It is my medical opinion that Omer Da Silva is able to go back to work on Monday Sept. 13, 2021.           Sincerely,        Lachelle Albarran MD

## 2021-09-10 NOTE — PATIENT INSTRUCTIONS
1. Type 2 Diabetes: Continue taking metformin 500 mg tablet TWICE per day and check morning (fasting) glucose levels 3-4 times per week, write down and bring to clinic. 2. 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 Fill the script at [facility]. 3. Pulmonary embolus (blood clot) in the right lower lung:  Continue taking apixaban (Eliquis) 5 mg tablet TWICE per day for at least six months (Feb 2022). Oxygen saturation is good today at 97% (goal above 94%). 4. Dyspnea (shortness of breath) with exertion:  Spirometry (pulmonary function test) today shows moderate restriction. START using Advair (fluticasone / salmeterol) discus inhaler 250-5, one puff TWICE per day. May benefit from pulmonary rehab.     5. Hypertension:  Blood pressure elevated today at 145/89, goal less than 130/80. Continue taking amlodipine (Norvasc) 5 mg tablet every evening. 6. Cough related to bronchospasms:  START taking guaifenesin (Muxinex) 600 mg tablet THREE times per day.         Follow-up in two weeks

## 2021-09-10 NOTE — PROGRESS NOTES
SUBJECTIVE:   Follow up from 8/18 for diabetes, started on metformin, morning glucose levels 200s. Plans to start back on daily walks with goal of 140s or less. He still coughs up mucous. Unable to afford citalopram.   Continues to experience dyspnea on exertion with intermittent pleuritic chest pain he attributes to pulmonary embolus. Needs work note for next week. History of present illness:  Hospitalized 8/5 - 8/9 with COVID-19 pneumon and subsequent RLL pulmonary embolus. Discharged on Eliquis starter pack (5 mg BID). Symptoms started 7/22. Remote smoker, quit at age 25 . No previous dvt or PE. Treated with convalescent plasma. Hypertension in the hospital and discharged on amlodipine and stopped after 3 days. Blood pressure good at home. History of type 2 diabetes (diagnosed 2009) treated with diet. Increased glucose levels on steroids in the hospital.  Discharged on sliding scale insulin. Anxiety symptoms have caused difficulty with sleeping.       MEDICATIONS:  metFORMIN (GLUCOPHAGE) 500 MG tablet Take 1 tablet by mouth 2 times daily (with meals)   apixaban (ELIQUIS) 5 MG TABS tablet Take 1 tablet by mouth 2 times daily    Inject 5-10 Units into the skin 3 times daily (discontinue)   citalopram (CELEXA) 20 MG tablet Take 1 tablet by mouth daily (did not start)   amLODIPine (NORVASC) 5 MG tablet Take 1 tablet by mouth daily (not taking)       Lab Results   Component Value Date    LABA1C 10.9 08/06/2021     Lab Results   Component Value Date    WBC 10.4 08/07/2021    HGB 13.7 08/07/2021     08/07/2021    MCV 85.7 08/07/2021     Lab Results   Component Value Date     08/07/2021    K 4.3 08/07/2021     08/07/2021    CO2 18 (L) 08/07/2021    BUN 16 08/07/2021    CREATININE 0.6 (L) 08/07/2021    GLUCOSE 274 (H) 08/07/2021     D-dimer (8/6) > 5250 ng/mL     SARS-CoV-2 RT-PCR (8/5) detected     PFT (8/18) 2.34 (53%) / 2.82 (49%) = 0.83 c/s moderate restriction    OBJECTIVE:    BP

## 2021-10-01 ENCOUNTER — OFFICE VISIT (OUTPATIENT)
Dept: INTERNAL MEDICINE CLINIC | Age: 51
End: 2021-10-01

## 2021-10-01 VITALS
SYSTOLIC BLOOD PRESSURE: 122 MMHG | HEART RATE: 92 BPM | BODY MASS INDEX: 30.52 KG/M2 | WEIGHT: 237.8 LBS | DIASTOLIC BLOOD PRESSURE: 72 MMHG | OXYGEN SATURATION: 97 % | HEIGHT: 74 IN

## 2021-10-01 DIAGNOSIS — E11.9 TYPE 2 DIABETES MELLITUS WITHOUT COMPLICATION, WITHOUT LONG-TERM CURRENT USE OF INSULIN (HCC): ICD-10-CM

## 2021-10-01 DIAGNOSIS — I26.93 SINGLE SUBSEGMENTAL PULMONARY EMBOLISM WITHOUT ACUTE COR PULMONALE (HCC): ICD-10-CM

## 2021-10-01 DIAGNOSIS — F41.8 ANXIOUS DEPRESSION: ICD-10-CM

## 2021-10-01 DIAGNOSIS — U07.1 COVID-19: Primary | ICD-10-CM

## 2021-10-01 PROCEDURE — 99214 OFFICE O/P EST MOD 30 MIN: CPT | Performed by: INTERNAL MEDICINE

## 2021-10-01 NOTE — PROGRESS NOTES
SUBJECTIVE:  Follow up from 9/10, post-COVID-19 with fatigue and dyspnea. Back to work at [address]. Still gets stressed but tolerating Celexa. Fatigued a few times per week at this point. Tolerating metformin, morning glucose levels less than 200.       History of present illness:  Hospitalized 8/5 - 8/9 with COVID-19 pneumon and subsequent RLL pulmonary embolus.  Discharged on Eliquis starter pack (5 mg BID).  Symptoms started 7/22.   Remote smoker, quit at age 25 . No previous dvt or PE.  Treated with convalescent plasma.  Hypertension in the hospital and discharged on amlodipine and stopped after 3 days.  Blood pressure good at home.  History of type 2 diabetes (diagnosed 2009) treated with diet.  Increased glucose levels on steroids in the hospital.  Discharged on sliding scale insulin.  Anxiety symptoms have caused difficulty with sleeping.      MEDICATIONS:  citalopram (CELEXA) 20 MG tablet Take 1 tablet by mouth daily   fluticasone-salmeterol (ADVAIR DISKUS) 250-50 MCG Inhale 1 puff into the lungs every 12 hours   guaiFENesin (MUCINEX) 600 MG  tablet Take 1 tablet by mouth 2 times daily   apixaban (ELIQUIS) 5 MG TABS tablet Take 1 tablet by mouth 2 times daily   metFORMIN (GLUCOPHAGE) 500 MG tablet Take 1 tablet by mouth 2 times daily    a Take 1 tablet by mouth daily (not taking)         Lab Results   Component Value Date    LABA1C 10.9 08/06/2021     Lab Results   Component Value Date    WBC 10.4 08/07/2021    HGB 13.7 08/07/2021     08/07/2021    MCV 85.7 08/07/2021     Lab Results   Component Value Date     08/07/2021    K 4.3 08/07/2021     08/07/2021    CO2 18 (L) 08/07/2021    BUN 16 08/07/2021    CREATININE 0.6 (L) 08/07/2021    GLUCOSE 274 (H) 08/07/2021       OBJECTIVE:    /72   Pulse 92   Ht 6' 2\" (1.88 m)   Wt 237 lb 12.8 oz (107.9 kg)   SpO2 97%   BMI 30.53 kg/m²   HEENT:  Oropharynx clear, no lymphadenopathy,   LUNGS:  Clear and without wheezes  HEART:  Regular rhythm, no appreciable murmur  ABD:  Benign, active bowel sounds  EXT:  No edema, pulses palpable  NEURO:  No focal neurologic deficits noted. ASSESSMENT / PLAN:   1. Type 2 Diabetes: Continue taking metformin 500 mg tablet TWICE per day. Check morning (fasting) glucose levels 3 times per week, write down and bring to clinic.    2. 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   We will continue for at least six months. 3. Pulmonary embolus (blood clot) in the right lower lung:  Continue taking apixaban (Eliquis) 5 mg tablet TWICE per day for at least six months (Feb 2022).  Oxygen saturation is good today at 97% (goal above 94%).    4. Dyspnea (shortness of breath) with exertion:  Continue using Advair (fluticasone / salmeterol) discus inhaler 250-5, ONE puff TWICE per day. 5. Cough related to bronchospasms:  Continue guaifenesin (Muxinex) 600 mg tablet up to THREE times per day AS NEEDED. 6. Blood pressure good today 122/72, No need for medication.         Follow-up in six weeks  We will check hemoglobin A1c and spirometry

## 2021-10-01 NOTE — PATIENT INSTRUCTIONS
1. Type 2 Diabetes: Continue taking metformin 500 mg tablet TWICE per day. Check morning (fasting) glucose levels 3 times per week, write down and bring to clinic.    2. 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   We will continue for at least six months. 3. Pulmonary embolus (blood clot) in the right lower lung:  Continue taking apixaban (Eliquis) 5 mg tablet TWICE per day for at least six months (Feb 2022).  Oxygen saturation is good today at 97% (goal above 94%).    4. Dyspnea (shortness of breath) with exertion:  Continue using Advair (fluticasone / salmeterol) discus inhaler 250-5, ONE puff TWICE per day. 5. Cough related to bronchospasms:  Continue guaifenesin (Muxinex) 600 mg tablet up to THREE times per day AS NEEDED. 6. Blood pressure good today 122/72, No need for medication.         Follow-up in six weeks  We will check hemoglobin A1c and spirometry

## 2021-10-01 NOTE — LETTER
[address] [place] [address]  Phone: [phone]  Fax: [phone]    Lisa Mccormick MD        October 1, 2021     Patient: Jose Mittal   YOB: 1970   Date of Visit: 10/1/2021           To Whom It May Concern:    Please excuse  Laura Okeefe from work on Sept. 4th-13th due to the covid virus infection.        Sincerely,        Lisa Mccormick MD

## 2023-10-24 ENCOUNTER — APPOINTMENT (OUTPATIENT)
Dept: GENERAL RADIOLOGY | Age: 53
End: 2023-10-24
Payer: COMMERCIAL

## 2023-10-24 ENCOUNTER — HOSPITAL ENCOUNTER (EMERGENCY)
Age: 53
Discharge: HOME | End: 2023-10-24
Attending: EMERGENCY MEDICINE
Payer: COMMERCIAL

## 2023-10-24 VITALS
HEIGHT: 71 IN | WEIGHT: 210 LBS | SYSTOLIC BLOOD PRESSURE: 171 MMHG | BODY MASS INDEX: 29.4 KG/M2 | DIASTOLIC BLOOD PRESSURE: 101 MMHG | HEART RATE: 91 BPM | OXYGEN SATURATION: 97 % | RESPIRATION RATE: 16 BRPM | TEMPERATURE: 98 F

## 2023-10-24 DIAGNOSIS — S61.411A LACERATION OF RIGHT HAND WITHOUT FOREIGN BODY, INITIAL ENCOUNTER: Primary | ICD-10-CM

## 2023-10-24 PROCEDURE — 99284 EMERGENCY DEPT VISIT MOD MDM: CPT

## 2023-10-24 PROCEDURE — 73130 X-RAY EXAM OF HAND: CPT

## 2023-10-24 PROCEDURE — 6370000000 HC RX 637 (ALT 250 FOR IP): Performed by: EMERGENCY MEDICINE

## 2023-10-24 PROCEDURE — 6360000002 HC RX W HCPCS: Performed by: EMERGENCY MEDICINE

## 2023-10-24 PROCEDURE — 90715 TDAP VACCINE 7 YRS/> IM: CPT | Performed by: EMERGENCY MEDICINE

## 2023-10-24 PROCEDURE — 90471 IMMUNIZATION ADMIN: CPT | Performed by: EMERGENCY MEDICINE

## 2023-10-24 RX ORDER — NAPROXEN 500 MG/1
500 TABLET ORAL 2 TIMES DAILY WITH MEALS
Qty: 60 TABLET | Refills: 5 | Status: SHIPPED | OUTPATIENT
Start: 2023-10-24

## 2023-10-24 RX ORDER — AMOXICILLIN AND CLAVULANATE POTASSIUM 875; 125 MG/1; MG/1
1 TABLET, FILM COATED ORAL ONCE
Status: COMPLETED | OUTPATIENT
Start: 2023-10-24 | End: 2023-10-24

## 2023-10-24 RX ORDER — IBUPROFEN 200 MG
TABLET ORAL
Qty: 14.2 G | Refills: 1 | Status: SHIPPED | OUTPATIENT
Start: 2023-10-24

## 2023-10-24 RX ORDER — AMOXICILLIN AND CLAVULANATE POTASSIUM 875; 125 MG/1; MG/1
1 TABLET, FILM COATED ORAL 2 TIMES DAILY
Qty: 14 TABLET | Refills: 0 | Status: SHIPPED | OUTPATIENT
Start: 2023-10-24 | End: 2023-10-31

## 2023-10-24 RX ORDER — NAPROXEN 250 MG/1
500 TABLET ORAL ONCE
Status: COMPLETED | OUTPATIENT
Start: 2023-10-24 | End: 2023-10-24

## 2023-10-24 RX ADMIN — AMOXICILLIN AND CLAVULANATE POTASSIUM 1 TABLET: 875; 125 TABLET, FILM COATED ORAL at 04:34

## 2023-10-24 RX ADMIN — TETANUS TOXOID, REDUCED DIPHTHERIA TOXOID AND ACELLULAR PERTUSSIS VACCINE, ADSORBED 0.5 ML: 5; 2.5; 8; 8; 2.5 SUSPENSION INTRAMUSCULAR at 04:34

## 2023-10-24 RX ADMIN — NAPROXEN 500 MG: 250 TABLET ORAL at 04:34

## 2023-10-24 ASSESSMENT — PAIN - FUNCTIONAL ASSESSMENT: PAIN_FUNCTIONAL_ASSESSMENT: 0-10

## 2023-10-24 ASSESSMENT — PAIN DESCRIPTION - LOCATION
LOCATION: HAND
LOCATION: HAND

## 2023-10-24 ASSESSMENT — PAIN DESCRIPTION - DESCRIPTORS: DESCRIPTORS: THROBBING

## 2023-10-24 ASSESSMENT — PAIN SCALES - GENERAL
PAINLEVEL_OUTOF10: 2
PAINLEVEL_OUTOF10: 1

## 2023-10-24 ASSESSMENT — PAIN DESCRIPTION - ORIENTATION
ORIENTATION: RIGHT
ORIENTATION: RIGHT
